# Patient Record
Sex: FEMALE | Race: BLACK OR AFRICAN AMERICAN | NOT HISPANIC OR LATINO | Employment: OTHER | URBAN - METROPOLITAN AREA
[De-identification: names, ages, dates, MRNs, and addresses within clinical notes are randomized per-mention and may not be internally consistent; named-entity substitution may affect disease eponyms.]

---

## 2017-03-01 PROBLEM — M20.41 HAMMER TOES OF BOTH FEET: Status: ACTIVE | Noted: 2017-03-01

## 2017-03-01 PROBLEM — M20.42 HAMMER TOES OF BOTH FEET: Status: ACTIVE | Noted: 2017-03-01

## 2017-03-01 PROBLEM — E11.40 DIABETIC NEUROPATHY: Status: ACTIVE | Noted: 2017-03-01

## 2017-04-25 PROBLEM — I50.30 HEART FAILURE WITH PRESERVED LEFT VENTRICULAR FUNCTION (HFPEF): Status: ACTIVE | Noted: 2017-04-25

## 2017-09-28 PROBLEM — N18.30 TYPE 2 DIABETES MELLITUS WITH STAGE 3 CHRONIC KIDNEY DISEASE, WITHOUT LONG-TERM CURRENT USE OF INSULIN: Status: ACTIVE | Noted: 2017-09-28

## 2017-09-28 PROBLEM — E11.22 TYPE 2 DIABETES MELLITUS WITH STAGE 3 CHRONIC KIDNEY DISEASE, WITHOUT LONG-TERM CURRENT USE OF INSULIN: Status: ACTIVE | Noted: 2017-09-28

## 2018-04-11 PROBLEM — S89.90XA KNEE INJURY: Status: ACTIVE | Noted: 2018-04-11

## 2018-05-10 ENCOUNTER — NURSE TRIAGE (OUTPATIENT)
Dept: ADMINISTRATIVE | Facility: CLINIC | Age: 68
End: 2018-05-10

## 2018-05-11 NOTE — TELEPHONE ENCOUNTER
Reason for Disposition   Diabetes drug error or overdose (e.g., insulin or extra dose)    Answer Assessment - Initial Assessment Questions  Pt's insulin changed today - stated she was to take 5 units of her novolog before meals and she took 25 units at 1900. cbg prior to this was 195. Currently eating dinner, bread, baked beans, ribs, mac and cheese. Has someone with her.    Protocols used:  POISONING-A-

## 2018-08-28 PROBLEM — I25.10 NON-OCCLUSIVE CORONARY ARTERY DISEASE: Status: ACTIVE | Noted: 2018-08-28

## 2018-08-28 PROBLEM — I25.10 NON-OCCLUSIVE CORONARY ARTERY DISEASE: Status: RESOLVED | Noted: 2018-08-28 | Resolved: 2018-08-28

## 2018-09-05 ENCOUNTER — TELEPHONE (OUTPATIENT)
Dept: ADMINISTRATIVE | Facility: HOSPITAL | Age: 68
End: 2018-09-05

## 2019-06-18 ENCOUNTER — PATIENT OUTREACH (OUTPATIENT)
Dept: ADMINISTRATIVE | Facility: HOSPITAL | Age: 69
End: 2019-06-18

## 2019-07-25 ENCOUNTER — TELEPHONE (OUTPATIENT)
Dept: ADMINISTRATIVE | Facility: HOSPITAL | Age: 69
End: 2019-07-25

## 2019-08-06 PROBLEM — R07.9 CHEST PAIN: Status: ACTIVE | Noted: 2019-08-06

## 2019-08-06 PROBLEM — I20.0 UNSTABLE ANGINA: Status: ACTIVE | Noted: 2019-08-06

## 2019-08-14 ENCOUNTER — PATIENT OUTREACH (OUTPATIENT)
Dept: ADMINISTRATIVE | Facility: HOSPITAL | Age: 69
End: 2019-08-14

## 2019-08-28 PROBLEM — I25.9 MYOCARDIAL ISCHEMIA: Status: ACTIVE | Noted: 2019-08-28

## 2019-08-28 PROBLEM — I25.110 CORONARY ARTERY DISEASE INVOLVING NATIVE CORONARY ARTERY OF NATIVE HEART WITH UNSTABLE ANGINA PECTORIS: Status: RESOLVED | Noted: 2018-08-28 | Resolved: 2019-08-28

## 2019-08-28 PROBLEM — I20.0 UNSTABLE ANGINA: Status: RESOLVED | Noted: 2019-08-06 | Resolved: 2019-08-28

## 2019-08-28 PROBLEM — I20.89 STABLE ANGINA: Status: ACTIVE | Noted: 2019-08-28

## 2019-08-28 PROBLEM — I25.110 CORONARY ARTERY DISEASE INVOLVING NATIVE CORONARY ARTERY OF NATIVE HEART WITH UNSTABLE ANGINA PECTORIS: Status: ACTIVE | Noted: 2018-08-28

## 2019-09-24 ENCOUNTER — TELEPHONE (OUTPATIENT)
Dept: ADMINISTRATIVE | Facility: HOSPITAL | Age: 69
End: 2019-09-24

## 2019-12-10 PROBLEM — Z86.010 HX OF COLONIC POLYPS: Status: ACTIVE | Noted: 2019-12-10

## 2019-12-10 PROBLEM — Z86.0100 HX OF COLONIC POLYPS: Status: ACTIVE | Noted: 2019-12-10

## 2020-01-29 PROBLEM — Z79.4 TYPE 2 DIABETES MELLITUS WITH STAGE 3 CHRONIC KIDNEY DISEASE, WITH LONG-TERM CURRENT USE OF INSULIN: Status: ACTIVE | Noted: 2017-09-28

## 2020-05-14 PROBLEM — I73.9 CLAUDICATION IN PERIPHERAL VASCULAR DISEASE: Status: ACTIVE | Noted: 2020-05-14

## 2020-05-20 PROBLEM — E11.22 TYPE 2 DIABETES MELLITUS WITH STAGE 3 CHRONIC KIDNEY DISEASE, WITHOUT LONG-TERM CURRENT USE OF INSULIN: Status: ACTIVE | Noted: 2020-05-20

## 2020-05-20 PROBLEM — I25.118 CORONARY ARTERY DISEASE OF NATIVE ARTERY OF NATIVE HEART WITH STABLE ANGINA PECTORIS: Status: ACTIVE | Noted: 2020-05-20

## 2020-05-20 PROBLEM — N18.30 TYPE 2 DIABETES MELLITUS WITH STAGE 3 CHRONIC KIDNEY DISEASE, WITH LONG-TERM CURRENT USE OF INSULIN: Status: RESOLVED | Noted: 2017-09-28 | Resolved: 2020-05-20

## 2020-05-20 PROBLEM — I73.9 CLAUDICATION: Status: ACTIVE | Noted: 2020-05-20

## 2020-05-20 PROBLEM — I51.7 LEFT ATRIAL ENLARGEMENT: Status: ACTIVE | Noted: 2020-05-20

## 2020-05-20 PROBLEM — I65.23 BILATERAL CAROTID ARTERY STENOSIS: Status: ACTIVE | Noted: 2020-05-20

## 2020-05-20 PROBLEM — Z79.4 TYPE 2 DIABETES MELLITUS WITH STAGE 3 CHRONIC KIDNEY DISEASE, WITH LONG-TERM CURRENT USE OF INSULIN: Status: RESOLVED | Noted: 2017-09-28 | Resolved: 2020-05-20

## 2020-05-20 PROBLEM — I50.30 HEART FAILURE WITH PRESERVED LEFT VENTRICULAR FUNCTION (HFPEF): Status: RESOLVED | Noted: 2017-04-25 | Resolved: 2020-05-20

## 2020-05-20 PROBLEM — I50.30 HEART FAILURE WITH PRESERVED EJECTION FRACTION, NYHA CLASS II: Status: ACTIVE | Noted: 2020-05-20

## 2020-05-20 PROBLEM — N18.30 TYPE 2 DIABETES MELLITUS WITH STAGE 3 CHRONIC KIDNEY DISEASE, WITHOUT LONG-TERM CURRENT USE OF INSULIN: Status: ACTIVE | Noted: 2020-05-20

## 2020-05-20 PROBLEM — I25.9 MYOCARDIAL ISCHEMIA: Status: RESOLVED | Noted: 2019-08-28 | Resolved: 2020-05-20

## 2020-05-20 PROBLEM — R07.9 CHEST PAIN: Status: RESOLVED | Noted: 2019-08-06 | Resolved: 2020-05-20

## 2020-05-20 PROBLEM — I73.9 CLAUDICATION IN PERIPHERAL VASCULAR DISEASE: Status: RESOLVED | Noted: 2020-05-14 | Resolved: 2020-05-20

## 2020-05-20 PROBLEM — T78.3XXA ACE INHIBITOR-AGGRAVATED ANGIOEDEMA: Status: ACTIVE | Noted: 2020-05-20

## 2020-05-20 PROBLEM — T46.4X5A ACE INHIBITOR-AGGRAVATED ANGIOEDEMA: Status: ACTIVE | Noted: 2020-05-20

## 2020-05-20 PROBLEM — E11.22 TYPE 2 DIABETES MELLITUS WITH STAGE 3 CHRONIC KIDNEY DISEASE, WITH LONG-TERM CURRENT USE OF INSULIN: Status: RESOLVED | Noted: 2017-09-28 | Resolved: 2020-05-20

## 2020-06-25 PROBLEM — I73.9 PERIPHERAL ARTERIAL DISEASE: Status: ACTIVE | Noted: 2020-06-25

## 2020-09-21 PROBLEM — I36.1 NONRHEUMATIC TRICUSPID VALVE REGURGITATION: Status: ACTIVE | Noted: 2020-09-21

## 2020-09-21 PROBLEM — R06.09 DYSPNEA ON EXERTION: Status: ACTIVE | Noted: 2020-09-21

## 2020-09-21 PROBLEM — I05.8 MITRAL VALVE SCLEROSIS: Status: ACTIVE | Noted: 2020-09-21

## 2020-09-21 PROBLEM — I65.21 STENOSIS OF RIGHT CAROTID ARTERY: Status: ACTIVE | Noted: 2020-09-21

## 2020-09-21 PROBLEM — I65.22 STENOSIS OF LEFT CAROTID ARTERY: Status: ACTIVE | Noted: 2020-05-20

## 2020-09-21 PROBLEM — R94.31 NONSPECIFIC ABNORMAL ELECTROCARDIOGRAM (ECG) (EKG): Status: ACTIVE | Noted: 2020-09-21

## 2020-09-21 PROBLEM — R00.1 SINUS BRADYCARDIA: Status: ACTIVE | Noted: 2020-09-21

## 2020-09-30 ENCOUNTER — LAB VISIT (OUTPATIENT)
Dept: PRIMARY CARE CLINIC | Facility: OTHER | Age: 70
End: 2020-09-30
Attending: INTERNAL MEDICINE
Payer: MEDICARE

## 2020-09-30 DIAGNOSIS — Z03.818 ENCOUNTER FOR OBSERVATION FOR SUSPECTED EXPOSURE TO OTHER BIOLOGICAL AGENTS RULED OUT: ICD-10-CM

## 2020-09-30 PROCEDURE — U0003 INFECTIOUS AGENT DETECTION BY NUCLEIC ACID (DNA OR RNA); SEVERE ACUTE RESPIRATORY SYNDROME CORONAVIRUS 2 (SARS-COV-2) (CORONAVIRUS DISEASE [COVID-19]), AMPLIFIED PROBE TECHNIQUE, MAKING USE OF HIGH THROUGHPUT TECHNOLOGIES AS DESCRIBED BY CMS-2020-01-R: HCPCS

## 2020-10-01 LAB — SARS-COV-2 RNA RESP QL NAA+PROBE: NOT DETECTED

## 2021-01-19 ENCOUNTER — LAB VISIT (OUTPATIENT)
Dept: PRIMARY CARE CLINIC | Facility: OTHER | Age: 71
End: 2021-01-19
Attending: INTERNAL MEDICINE
Payer: MEDICARE

## 2021-01-19 DIAGNOSIS — Z20.822 ENCOUNTER FOR LABORATORY TESTING FOR COVID-19 VIRUS: ICD-10-CM

## 2021-01-19 PROCEDURE — U0003 INFECTIOUS AGENT DETECTION BY NUCLEIC ACID (DNA OR RNA); SEVERE ACUTE RESPIRATORY SYNDROME CORONAVIRUS 2 (SARS-COV-2) (CORONAVIRUS DISEASE [COVID-19]), AMPLIFIED PROBE TECHNIQUE, MAKING USE OF HIGH THROUGHPUT TECHNOLOGIES AS DESCRIBED BY CMS-2020-01-R: HCPCS

## 2021-01-25 ENCOUNTER — TELEPHONE (OUTPATIENT)
Dept: PRIMARY CARE CLINIC | Facility: CLINIC | Age: 71
End: 2021-01-25

## 2021-05-06 ENCOUNTER — PATIENT MESSAGE (OUTPATIENT)
Dept: RESEARCH | Facility: HOSPITAL | Age: 71
End: 2021-05-06

## 2022-08-17 PROBLEM — E11.42 ONYCHOMYCOSIS OF MULTIPLE TOENAILS WITH TYPE 2 DIABETES MELLITUS AND PERIPHERAL NEUROPATHY: Status: ACTIVE | Noted: 2022-08-17

## 2022-08-17 PROBLEM — Z79.4 TYPE 2 DIABETES MELLITUS WITH STAGE 3B CHRONIC KIDNEY DISEASE, WITH LONG-TERM CURRENT USE OF INSULIN: Status: ACTIVE | Noted: 2020-05-20

## 2022-08-17 PROBLEM — B35.1 ONYCHOMYCOSIS OF MULTIPLE TOENAILS WITH TYPE 2 DIABETES MELLITUS AND PERIPHERAL NEUROPATHY: Status: ACTIVE | Noted: 2022-08-17

## 2022-08-17 PROBLEM — N18.32 TYPE 2 DIABETES MELLITUS WITH STAGE 3B CHRONIC KIDNEY DISEASE, WITH LONG-TERM CURRENT USE OF INSULIN: Status: ACTIVE | Noted: 2020-05-20

## 2022-08-17 PROBLEM — E11.69 HYPERLIPIDEMIA ASSOCIATED WITH TYPE 2 DIABETES MELLITUS: Status: ACTIVE | Noted: 2022-08-17

## 2022-08-17 PROBLEM — E11.69 ONYCHOMYCOSIS OF MULTIPLE TOENAILS WITH TYPE 2 DIABETES MELLITUS AND PERIPHERAL NEUROPATHY: Status: ACTIVE | Noted: 2022-08-17

## 2022-08-17 PROBLEM — E78.5 HYPERLIPIDEMIA ASSOCIATED WITH TYPE 2 DIABETES MELLITUS: Status: ACTIVE | Noted: 2022-08-17

## 2023-04-29 PROBLEM — E11.9 DIABETES MELLITUS, TYPE 2: Status: ACTIVE | Noted: 2020-05-20

## 2023-04-29 PROBLEM — R07.9 CHEST PAIN: Status: RESOLVED | Noted: 2019-08-06 | Resolved: 2023-04-29

## 2023-12-01 PROBLEM — E55.9 VITAMIN D DEFICIENCY: Status: ACTIVE | Noted: 2023-12-01

## 2024-01-22 DIAGNOSIS — M79.672 LEFT FOOT PAIN: Primary | ICD-10-CM

## 2024-01-24 ENCOUNTER — LAB VISIT (OUTPATIENT)
Dept: LAB | Facility: HOSPITAL | Age: 74
End: 2024-01-24
Attending: PSYCHIATRY & NEUROLOGY
Payer: MEDICARE

## 2024-01-24 ENCOUNTER — OFFICE VISIT (OUTPATIENT)
Dept: NEUROLOGY | Facility: CLINIC | Age: 74
End: 2024-01-24
Payer: MEDICARE

## 2024-01-24 VITALS
DIASTOLIC BLOOD PRESSURE: 75 MMHG | BODY MASS INDEX: 24.39 KG/M2 | HEIGHT: 71 IN | HEART RATE: 94 BPM | SYSTOLIC BLOOD PRESSURE: 135 MMHG | WEIGHT: 174.19 LBS

## 2024-01-24 DIAGNOSIS — E11.42 DIABETIC POLYNEUROPATHY ASSOCIATED WITH TYPE 2 DIABETES MELLITUS: Primary | ICD-10-CM

## 2024-01-24 DIAGNOSIS — R20.0 NUMBNESS AND TINGLING: ICD-10-CM

## 2024-01-24 DIAGNOSIS — M79.672 LEFT FOOT PAIN: ICD-10-CM

## 2024-01-24 DIAGNOSIS — R20.2 NUMBNESS AND TINGLING: ICD-10-CM

## 2024-01-24 DIAGNOSIS — M86.179 OTHER ACUTE OSTEOMYELITIS, UNSPECIFIED ANKLE AND FOOT: ICD-10-CM

## 2024-01-24 DIAGNOSIS — I73.9 PAD (PERIPHERAL ARTERY DISEASE): ICD-10-CM

## 2024-01-24 LAB
ALBUMIN SERPL BCP-MCNC: 3.8 G/DL (ref 3.5–5.2)
ANION GAP SERPL CALC-SCNC: 9 MMOL/L (ref 8–16)
BASOPHILS # BLD AUTO: 0.05 K/UL (ref 0–0.2)
BASOPHILS NFR BLD: 0.7 % (ref 0–1.9)
BUN SERPL-MCNC: 24 MG/DL (ref 8–23)
CALCIUM SERPL-MCNC: 10.1 MG/DL (ref 8.7–10.5)
CHLORIDE SERPL-SCNC: 106 MMOL/L (ref 95–110)
CO2 SERPL-SCNC: 27 MMOL/L (ref 23–29)
CREAT SERPL-MCNC: 1.5 MG/DL (ref 0.5–1.4)
DIFFERENTIAL METHOD BLD: ABNORMAL
EOSINOPHIL # BLD AUTO: 0.4 K/UL (ref 0–0.5)
EOSINOPHIL NFR BLD: 5.5 % (ref 0–8)
ERYTHROCYTE [DISTWIDTH] IN BLOOD BY AUTOMATED COUNT: 14.1 % (ref 11.5–14.5)
ERYTHROCYTE [SEDIMENTATION RATE] IN BLOOD BY PHOTOMETRIC METHOD: 35 MM/HR (ref 0–36)
EST. GFR  (NO RACE VARIABLE): 37 ML/MIN/1.73 M^2
GLUCOSE SERPL-MCNC: 104 MG/DL (ref 70–110)
HCT VFR BLD AUTO: 30 % (ref 37–48.5)
HGB BLD-MCNC: 9.8 G/DL (ref 12–16)
IMM GRANULOCYTES # BLD AUTO: 0.02 K/UL (ref 0–0.04)
IMM GRANULOCYTES NFR BLD AUTO: 0.3 % (ref 0–0.5)
LYMPHOCYTES # BLD AUTO: 1.8 K/UL (ref 1–4.8)
LYMPHOCYTES NFR BLD: 24.1 % (ref 18–48)
MCH RBC QN AUTO: 28.7 PG (ref 27–31)
MCHC RBC AUTO-ENTMCNC: 32.7 G/DL (ref 32–36)
MCV RBC AUTO: 88 FL (ref 82–98)
MONOCYTES # BLD AUTO: 0.7 K/UL (ref 0.3–1)
MONOCYTES NFR BLD: 8.6 % (ref 4–15)
NEUTROPHILS # BLD AUTO: 4.7 K/UL (ref 1.8–7.7)
NEUTROPHILS NFR BLD: 60.8 % (ref 38–73)
NRBC BLD-RTO: 0 /100 WBC
PHOSPHATE SERPL-MCNC: 2.9 MG/DL (ref 2.7–4.5)
PLATELET # BLD AUTO: 349 K/UL (ref 150–450)
PLATELET # BLD AUTO: 349 K/UL (ref 150–450)
PMV BLD AUTO: 10.3 FL (ref 9.2–12.9)
PMV BLD AUTO: 10.3 FL (ref 9.2–12.9)
POTASSIUM SERPL-SCNC: 4.4 MMOL/L (ref 3.5–5.1)
RBC # BLD AUTO: 3.41 M/UL (ref 4–5.4)
SODIUM SERPL-SCNC: 142 MMOL/L (ref 136–145)
WBC # BLD AUTO: 7.64 K/UL (ref 3.9–12.7)

## 2024-01-24 PROCEDURE — 85025 COMPLETE CBC W/AUTO DIFF WBC: CPT | Performed by: PSYCHIATRY & NEUROLOGY

## 2024-01-24 PROCEDURE — 36415 COLL VENOUS BLD VENIPUNCTURE: CPT | Performed by: PSYCHIATRY & NEUROLOGY

## 2024-01-24 PROCEDURE — 82607 VITAMIN B-12: CPT | Performed by: PSYCHIATRY & NEUROLOGY

## 2024-01-24 PROCEDURE — 85652 RBC SED RATE AUTOMATED: CPT | Performed by: PSYCHIATRY & NEUROLOGY

## 2024-01-24 PROCEDURE — 80069 RENAL FUNCTION PANEL: CPT | Performed by: PSYCHIATRY & NEUROLOGY

## 2024-01-24 PROCEDURE — 99999 PR PBB SHADOW E&M-EST. PATIENT-LVL V: CPT | Mod: PBBFAC,,, | Performed by: PSYCHIATRY & NEUROLOGY

## 2024-01-24 PROCEDURE — 99205 OFFICE O/P NEW HI 60 MIN: CPT | Mod: S$GLB,,, | Performed by: PSYCHIATRY & NEUROLOGY

## 2024-01-24 RX ORDER — PREGABALIN 75 MG/1
CAPSULE ORAL
Qty: 90 CAPSULE | Refills: 5 | Status: SHIPPED | OUTPATIENT
Start: 2024-01-24 | End: 2024-03-12 | Stop reason: SDUPTHER

## 2024-01-24 NOTE — PROGRESS NOTES
Subjective:       Patient ID: Porsche Ring is a 73 y.o. female.    Chief Complaint: Foot Pain (Bilateral Foot pain do to nerve related issues but mostly left sided)      Ms Ring is a 73 yr old AAF w.PAD, s/p LLE revascularization procedure 3 weeks ago, T2DM, DPN, HF, HTN, HLD CKD3, and glaucoma and is here with c/o severe LLE pain.  She is a poor historian in the chronology of the history of the onset of her left leg pain is vague at best.  She is distressed at least in part due to pain that she is experiencing today.    When pressed for details she states that all of the toes of the left foot the toenails, and as well as the lateral left foot hurt severely.  She describes burning and occasional electric shocks.  There is tingling in the sole of the left foot and in the toes of the right foot.  She feels that there is fluid running through her foot.  She does have a component of cramps and restlessness of both legs and she feels like all of her toes are swollen.  She states this has been most severe for the past month despite the absence of an injury.   She denies fever but she states she is worried about an infection.      She states she has been to a podiatrist urgent care in the emergency room.      She gets some relief by propping the leg and it is much worse with weight-bearing.  She states that she has to slide her leg using a rolling walker.  There have been no falls.      Both arms are fine in the right foot is at baseline with regard to DPN.      She does not smoke or drink.      As noted recently had left lower extremity angiogram for PAD for which she had laser therapy.        Surgery Date: 1/6/2023  Pre-op diagnosis: PAD (peripheral artery disease) [I73.9]  Post-op diagnosis: Post-Op Diagnosis Codes:     * PAD (peripheral artery disease) [I73.9]  Procedure: Left leg peripheral angiogram with runoff.   Indication: PAD with claudication  Physician:  Gaudencio Evans MD     Entry: Right femoral artery.  Closure with Vascade.      Angiographic Findings:      Right Lower Extremity   -Not visualized      Left Lower Extremity  -common iliac artery: not visualized  -external iliac artery:not visualized  -internal iliac artery:not visualized  -common femoral artery: not visualized  -SFA:  Proximal and distal 90% InStent restenosis  -popliteal artery:  100% InStent restenosis  -anterior tibial artery:  100% occluded  -tibioperoneal trunk:  100% InStent restenosis  -posterior tibial artery:  100% occluded  -peroneal artery:  100% InStent restenosis     Intervention:  1. Successful laser atherectomy/ PTA/ DCB of the left SFA with reduction of stenosis from 90% to 20%  2. Successful laser atherectomy/ PTA/ DCB of the left popliteal artery with reduction of stenosis from 100% to 10%.  3. Successful laser atherectomy/ PTA/ DCB of the left tibioperoneal trunk artery with reduction of stenosis from 100% to 10%.  4. Successful laser atherectomy/ PTA of the left peroneal artery with reduction of stenosis from 100% to 10%.        Complications: none  Total Contrast:5 mL  CO2: 40 ml  Estimated Blood Loss: 20 mL  Placement: Observation        Plan:  1. Results of procedure and plan of care discussed with family.   2. The patient was hemodynamically stable at the end of the procedure and transferred to the cath lab recovery area.  3. Continue aggressive medical management for PVD with Plavix, Eliquis, Pletal and high-dose statin.  Patient was reloaded with 300 mg of Plavix in cath lab and will resume 75 mg daily a Plavix starting tomorrow  4. No ASA to avoid triple therapy  5. Resume Eliquis tonight   6. Discharge home today once bedrest complete and criteria met  7. Follow up with [facility] as OP     Danay Landry, RN, BSN scribe for MD Gaudencio Torres MD       Case Tracking Events                    US Lower Extremity Veins Left  Order: [id number]  Status: Final result       Visible to patient: Yes (not seen)        Next appt: 01/29/2024 at 10:15 AM in Podiatry (Jayda Deng DPM)       Dx: Leg pain    0 Result Notes  Details      Reading Physician Reading Date Result Priority  Chava Gimenez MD  [phone] 7/20/2023 STAT    Narrative & Impression  EXAMINATION:  US LOWER EXTREMITY VEINS LEFT     CLINICAL HISTORY:  Leg pain     COMPARISON:  None     FINDINGS:  Duplex ultrasound evaluation of the deep venous system of the left lower extremity demonstrates patency and compressibility of the imaged vessels.  No intraluminal echogenic focus identified to suggest thrombus.     Impression:     No sonographic evidence of DVT of the left lower extremity.        Electronically signed by: Chava Gimenez MD  Date:                                            07/20/2023  Time:                                           22:34        Exam Ended: 07/20/23 20:03 CDT              Past Medical History:   Diagnosis Date    Anemia     Anxiety and depression     Arthritis     Bronchitis     Bulging of lumbar intervertebral disc     Carotid bruit     Cataract     CHF (congestive heart failure)     Chronic kidney disease     Chronic pain     scoliosis    COPD (chronic obstructive pulmonary disease)     Coronary artery disease     Deep vein thrombosis     Depression     Diabetes mellitus, type 2     Diastolic heart failure     Digestive disorder     GERD    Encounter for blood transfusion     Glaucoma     Heart murmur     History of 2019 novel coronavirus disease (COVID-19) 12/06/2022    Hyperlipidemia     Hypertension     Neuropathy     Neuropathy     Pneumonia 11/10/2019    Pneumonia 2019    PVD (peripheral vascular disease)     Restless leg syndrome     Scoliosis deformity of spine     UTI (urinary tract infection)     Valvular regurgitation     mild mr    Venous insufficiency       Past Surgical History:   Procedure Laterality Date     stent left leg Left     X 5    ANGIOGRAM LEFT LEG      JUNE OR JULY 2023 AT ANOTHER FACILITY    ANGIOGRAM, EXTREMITY,  BILATERAL N/A 2023    Procedure: ANGIOGRAM, EXTREMITY, BILATERAL;  Surgeon: Gaudencio Evans MD;  Location: [facility] CATH;  Service: Cardiology;  Laterality: N/A;    CATARACT EXTRACTION Right 2017    CE IOL stent placed    CATARACT EXTRACTION Left 2017    CE IOL stent placed     SECTION      COLONOSCOPY N/A 12/10/2019    Procedure: COLONOSCOPY;  Surgeon: Luis Bogran-Reyes, MD;  Location: [facility];  Service: Endoscopy;  Laterality: N/A;    EYE SURGERY      HYSTERECTOMY      INSERTION OF STENT INTO PERIPHERAL VESSEL Right 2024    Procedure: INSERTION, STENT, VESSEL, PERIPHERAL;  Surgeon: Gaudencio Evans MD;  Location: [facility] CATH;  Service: Cardiology;  Laterality: Right;    KNEE SURGERY Left     LEFT HEART CATHETERIZATION Left 2019    Procedure: CATHETERIZATION, HEART, LEFT;  Surgeon: Cecil Orozco MD;  Location: [facility] CATH LAB;  Service: Cardiology;  Laterality: Left;    OOPHORECTOMY      PERCUTANEOUS TRANSLUMINAL ANGIOPLASTY (PTA) OF PERIPHERAL VESSEL Left 2020    Procedure: PTA, PERIPHERAL VESSEL;  Surgeon: Gaudencio Evans MD;  Location: [facility] CATH;  Service: Cardiology;  Laterality: Left;  Left popliteal intervention  w/ CO2 via R CFA    PERCUTANEOUS TRANSLUMINAL ANGIOPLASTY (PTA) OF PERIPHERAL VESSEL N/A 2020    Procedure: PTA, PERIPHERAL VESSEL;  Surgeon: Gaudencio Evans MD;  Location: [facility] CATH;  Service: Cardiology;  Laterality: N/A;   right leg intervention via the left common femoral artery    PERCUTANEOUS TRANSLUMINAL ANGIOPLASTY (PTA) OF PERIPHERAL VESSEL N/A 10/29/2020    Procedure: PTA, PERIPHERAL VESSEL and possible PVI by R CFA U&O approach;  Surgeon: Gaudencio Evans MD;  Location: [facility] CATH;  Service: Cardiology;  Laterality: N/A;    TUBAL LIGATION      ULTRASOUND GUIDANCE  2019    Procedure: Ultrasound Guidance;  Surgeon: Cecil Orozco MD;  Location: [facility] CATH LAB;  Service: Cardiology;;        Current Outpatient Medications:      amLODIPine (NORVASC) 5 MG tablet, Take 1 tablet (5 mg total) by mouth once daily., Disp: 30 tablet, Rfl: 11    apixaban (ELIQUIS) 5 mg Tab, Take 1 tablet (5 mg total) by mouth 2 (two) times daily., Disp: , Rfl:     blood sugar diagnostic Strp, 1 each by Misc.(Non-Drug; Combo Route) route 3 (three) times daily., Disp: 300 strip, Rfl: 3    carvediloL (COREG) 12.5 MG tablet, Take 1 tablet (12.5 mg total) by mouth 2 (two) times daily with meals., Disp: 180 tablet, Rfl: 3    cilostazoL (PLETAL) 50 MG Tab, Take 1 tablet (50 mg total) by mouth 2 (two) times daily., Disp: 180 tablet, Rfl: 3    clopidogreL (PLAVIX) 75 mg tablet, Take 1 tablet (75 mg total) by mouth once daily., Disp: 90 tablet, Rfl: 3    dexlansoprazole (DEXILANT) 60 mg capsule, Take 60 mg by mouth once daily., Disp: , Rfl:     DULoxetine (CYMBALTA) 60 MG capsule, TAKE 1 CAPSULE(60 MG) BY MOUTH EVERY EVENING, Disp: 30 capsule, Rfl: 3    ergocalciferol (ERGOCALCIFEROL) 50,000 unit Cap, Take 1 capsule (50,000 Units total) by mouth every 7 days., Disp: 12 capsule, Rfl: 1    furosemide (LASIX) 40 MG tablet, Take 40 mg by mouth once daily., Disp: , Rfl:     HYDROcodone-acetaminophen (NORCO)  mg per tablet, Take 1 tablet by mouth every 8 (eight) hours as needed for Pain. , Disp: , Rfl:     lancets 32 gauge Misc, 1 lancet by Misc.(Non-Drug; Combo Route) route 3 (three) times daily before meals., Disp: 300 each, Rfl: 3    latanoprost 0.005 % ophthalmic solution, Place 1 drop into both eyes once daily., Disp: , Rfl:     linaGLIPtin (TRADJENTA) 5 mg Tab tablet, Take 1 tablet (5 mg total) by mouth once daily., Disp: 90 tablet, Rfl: 1    losartan (COZAAR) 25 MG tablet, Take 25 mg by mouth every evening., Disp: , Rfl:     mv-min-FA-D3-om3-dha-epa-fish 200 mcg-500 unit-200 mg Cap, Take 2 capsules by mouth once daily., Disp: , Rfl:     ranolazine (RANEXA) 500 MG Tb12, Take 500 mg by mouth 2 (two) times daily. NOON AND NIGHTLY, Disp: , Rfl:     rosuvastatin (CRESTOR)  40 MG Tab, TAKE 1 TABLET(40 MG) BY MOUTH EVERY EVENING FOR CHOLESTEROL, Disp: 90 tablet, Rfl: 3    TRESIBA FLEXTOUCH U-100 100 unit/mL (3 mL) insulin pen, ADMINISTER 52 UNITS UNDER THE SKIN EVERY DAY, Disp: , Rfl:     blood-glucose meter (TRUE METRIX AIR GLUCOSE METER) kit, True metrix meter kit; Use as instructed, Disp: 1 each, Rfl: 0    LIDOcaine 5 % Gel, Apply to both feet twice a day as needed, Disp: 30 g, Rfl: 5    pregabalin (LYRICA) 75 MG capsule, One po mid day and 2 po qhs, Disp: 90 capsule, Rfl: 5   Review of patient's allergies indicates:   Allergen Reactions    Jardiance [empagliflozin] Other (See Comments)     UTI     Lisinopril Swelling    Metformin Other (See Comments)     CKD/HF        Review of Systems   Constitutional:  Negative for fever.   Genitourinary:  Positive for bladder incontinence (for yrs. + hx of bladder suspension).   Musculoskeletal:  Positive for back pain (+ hx of LBP, was relieved with blocks at a pain clinic ( yrs ago)) and gait problem.   Neurological:  Positive for weakness.   Psychiatric/Behavioral:  Positive for sleep disturbance. Self-injury: hard to sleep bc of the pain.           Objective:      Physical Exam  Musculoskeletal:      Right lower leg: No edema.      Left lower leg: Edema present.      Comments: Left ankle is very warm   Neurological:      Cranial Nerves: Cranial nerves 2-12 are intact. No dysarthria.      Motor: Weakness and abnormal muscle tone (mild incr tone BLE) present. No tremor.      Gait: Gait abnormal (antalgic, hobbling).      Deep Tendon Reflexes:      Reflex Scores:       Tricep reflexes are 1+ on the right side and 1+ on the left side.       Bicep reflexes are 1+ on the right side and 1+ on the left side.       Patellar reflexes are 0 on the right side and 0 on the left side.       Achilles reflexes are 0 on the right side and 0 on the left side.     Comments: EOMI    + allodynia of Lt foot globally  Normal touch and PP both feet and legs.   BUE  5/5 prox and distal    Left ankle and foot exquisitely sensitive to palpation. Left ankle moderately swollen and very warm.No skin breakdown. No redness.   Toenails thick and 2nd  toe overlaps the first in both feet.    Cannot tolerate wt bearing on Lt foot  Cannot tolerate palpation of Lt foot  Rt foot is fine, no pain to touch or pressure and not swollen.     Large flat eccyhmosis across anterior and superior Lt thigh             Assessment:       1. Diabetic polyneuropathy associated with type 2 diabetes mellitus    2. Left foot pain    3. Numbness and tingling        Plan:          Subacute severe pain in the LLE in unhealthy elderly pt with DPN and PAD.  Etiology not simply DPN.    Note 3 weeks s/p successful laser atherectomy of multiple aa of the LLE   By hx the pain predated this procedure.  Exam is notable for warmth and swelling of the Lt ankle and foot ( not present in Rt foot) , allodynia and severe tenderness to palpation. She has no fever but an infection is in ddx and thus plan imaging and lab today  Note CRPS in ddx.    Will change gabapentin to lyrica and add topical lidocaine.               Skylar Willis MD   01/24/2024   9:47 AM

## 2024-01-25 ENCOUNTER — TELEPHONE (OUTPATIENT)
Dept: NEUROLOGY | Facility: CLINIC | Age: 74
End: 2024-01-25
Payer: MEDICARE

## 2024-01-25 DIAGNOSIS — M79.672 LEFT FOOT PAIN: Primary | ICD-10-CM

## 2024-01-25 LAB — VIT B12 SERPL-MCNC: 438 PG/ML (ref 210–950)

## 2024-01-25 NOTE — TELEPHONE ENCOUNTER
----- Message from Skylar Willis MD sent at 1/25/2024  9:51 AM CST -----  Regarding: RE: mri  Ok thx. The venous US can be at [facility] as well. Let the pharmacy know the lidocaine cream is ok . cg  ----- Message -----  From: Jazz Ramirez MA  Sent: 1/25/2024   8:52 AM CST  To: Skylar Willis MD  Subject: FW: mri                                          Scheduled and the patient is scheduled for both MRI's done today at 1 pm at [facility] in [place]. I am working on getting the CV and also EMG scheduled also.  ----- Message -----  From: Skylar Willis MD  Sent: 1/24/2024   5:51 PM CST  To: Jazz Ramirez MA  Subject: mri                                              Jazz, I ordered a stat MRI of the Lt ankle and foot ( w/out contrast) on this pt. Can yo uhelp to get this scheduled asap? If ins denies without first doing an Xray, ask her to go get the xray right away, ie tomorrow. Ty, cg

## 2024-01-26 ENCOUNTER — TELEPHONE (OUTPATIENT)
Dept: NEUROLOGY | Facility: CLINIC | Age: 74
End: 2024-01-26
Payer: MEDICARE

## 2024-01-26 NOTE — TELEPHONE ENCOUNTER
----- Message from Skylar Willis MD sent at 1/26/2024  3:14 PM CST -----  Pls let her know the US is negative for a clot and the MRI shows swelling but nothing suggestive of an infection in the soft tissue or bone, but that I have to defer to her PCP and that I have been unable to reach him. She should reach out to the cardiologist who did her procedure first; he knows her well

## 2024-01-26 NOTE — TELEPHONE ENCOUNTER
----- Message from Sumaya August sent at 1/26/2024 10:32 AM CST -----  Type:  Pharmacy Calling to Clarify an RX    Name of Caller:Fer  Pharmacy Name:[facility]  Prescription Name:LIDOcaine 5 % Gel  What do they need to clarify?:The gel is not available to order.  Do you want to change it to the cream  Best Call Back Number:[phone]  fax [phone]  Additional Information:  Pharmacy stated they have been waiting five days for an answer.

## 2024-01-26 NOTE — TELEPHONE ENCOUNTER
Called and spoke to [place] pharmacy about patients medication switch from gel to cream. The staff voiced understanding.

## 2024-02-16 ENCOUNTER — OFFICE VISIT (OUTPATIENT)
Dept: PODIATRY | Facility: CLINIC | Age: 74
End: 2024-02-16
Payer: MEDICARE

## 2024-02-16 VITALS
DIASTOLIC BLOOD PRESSURE: 71 MMHG | HEART RATE: 79 BPM | SYSTOLIC BLOOD PRESSURE: 135 MMHG | BODY MASS INDEX: 25.06 KG/M2 | WEIGHT: 179 LBS | HEIGHT: 71 IN

## 2024-02-16 DIAGNOSIS — M21.619 BUNION: Chronic | ICD-10-CM

## 2024-02-16 DIAGNOSIS — M79.672 LEFT FOOT PAIN: Primary | ICD-10-CM

## 2024-02-16 DIAGNOSIS — M20.42 HAMMER TOE OF LEFT FOOT: Chronic | ICD-10-CM

## 2024-02-16 DIAGNOSIS — I73.9 PAD (PERIPHERAL ARTERY DISEASE): ICD-10-CM

## 2024-02-16 PROCEDURE — 99203 OFFICE O/P NEW LOW 30 MIN: CPT | Mod: S$GLB,,, | Performed by: PODIATRIST

## 2024-02-16 PROCEDURE — 99999 PR PBB SHADOW E&M-EST. PATIENT-LVL IV: CPT | Mod: PBBFAC,,, | Performed by: PODIATRIST

## 2024-02-16 RX ORDER — GABAPENTIN 300 MG/1
CAPSULE ORAL
COMMUNITY
Start: 2023-05-05 | End: 2024-03-12

## 2024-02-16 RX ORDER — BUDESONIDE AND FORMOTEROL FUMARATE DIHYDRATE 160; 4.5 UG/1; UG/1
AEROSOL RESPIRATORY (INHALATION)
COMMUNITY

## 2024-02-16 RX ORDER — CIPROFLOXACIN 500 MG/1
TABLET ORAL
COMMUNITY

## 2024-02-16 RX ORDER — HYDROCHLOROTHIAZIDE 25 MG/1
TABLET ORAL
COMMUNITY

## 2024-02-16 RX ORDER — FLUTICASONE PROPIONATE 50 MCG
SPRAY, SUSPENSION (ML) NASAL
COMMUNITY

## 2024-02-16 RX ORDER — TAMSULOSIN HYDROCHLORIDE 0.4 MG/1
1 CAPSULE ORAL
COMMUNITY
Start: 2023-05-02

## 2024-02-16 RX ORDER — ALBUTEROL SULFATE 90 UG/1
AEROSOL, METERED RESPIRATORY (INHALATION)
COMMUNITY

## 2024-02-28 ENCOUNTER — HOSPITAL ENCOUNTER (OUTPATIENT)
Dept: CARDIOLOGY | Facility: HOSPITAL | Age: 74
Discharge: HOME | End: 2024-02-28
Attending: PSYCHIATRY & NEUROLOGY
Payer: MEDICARE

## 2024-02-28 DIAGNOSIS — M79.672 LEFT FOOT PAIN: ICD-10-CM

## 2024-02-28 PROCEDURE — 93971 EXTREMITY STUDY: CPT | Mod: 26,LT,, | Performed by: INTERNAL MEDICINE

## 2024-02-28 PROCEDURE — 93971 EXTREMITY STUDY: CPT | Mod: LT

## 2024-02-28 NOTE — PROGRESS NOTES
PODIATRY    PATIENT ID:  Porsche Ring is a 73 y.o. female.     CHIEF CONCERN:   Foot Pain (Left foot pain since January.)         MEDICAL DECISION MAKING:      Problem List Items Addressed This Visit       PAD (peripheral artery disease)    Overview     S/p 3 stents LLE          Other Visit Diagnoses       Left foot pain    -  Primary    Hammer toe of left foot  (Chronic)       Bunion  (Chronic)               I counseled the patient on the patient's conditions, their implications and medical management.    Xrays ordered.   Shoe and activity modification as needed for relief.     Continue conservative treatment for foot pain, not good candidate for bunion and hammertoe surgery due to PVD.    OTC topical NSAIDS as needed pain          HISTORY OF PRESENT ILLNESS:  Porsche Ring is a 73 y.o. female with concerns regarding: Foot Pain (Left foot pain since January.)  Due to bunions and hammertoes.  She also has PVD s/p stent insertion.   Patient reports symptoms/signs have been present since January.   Trauma/injury to the area:  none recalled.         Patient Active Problem List   Diagnosis    Primary hypertension    HLD (hyperlipidemia)    PAD (peripheral artery disease)    CKD (chronic kidney disease) stage 3, GFR 30-59 ml/min    Glaucoma    S/P peripheral artery angioplasty with stent placement    Mild mitral regurgitation    Left ventricular hypertrophy    Diabetic neuropathy    Hammer toes of both feet    Knee injury    Stable angina    Hx of colonic polyps    Heart failure with preserved ejection fraction, NYHA class II    Coronary artery disease of native artery of native heart with stable angina pectoris    Diabetes mellitus, type 2    Left atrial enlargement    Stenosis of left carotid artery    Claudication    ACE inhibitor-aggravated angioedema    Peripheral arterial disease    Dyspnea on exertion    Nonrheumatic tricuspid valve regurgitation    Mitral valve sclerosis    Sinus bradycardia    Nonspecific  abnormal electrocardiogram (ECG) (EKG)    Stenosis of right carotid artery    Onychomycosis of multiple toenails with type 2 diabetes mellitus and peripheral neuropathy    Hyperlipidemia associated with type 2 diabetes mellitus    BMI 26.0-26.9,adult    Vitamin D deficiency       Current Outpatient Medications on File Prior to Visit   Medication Sig Dispense Refill    albuterol (VENTOLIN HFA) 90 mcg/actuation inhaler 1 puff as needed Inhalation every 4 hrs      amLODIPine (NORVASC) 5 MG tablet Take 1 tablet (5 mg total) by mouth once daily. 30 tablet 11    apixaban (ELIQUIS) 5 mg Tab Take 1 tablet (5 mg total) by mouth 2 (two) times daily.      blood sugar diagnostic Strp 1 each by Misc.(Non-Drug; Combo Route) route 3 (three) times daily. 300 strip 3    carvediloL (COREG) 12.5 MG tablet Take 1 tablet (12.5 mg total) by mouth 2 (two) times daily with meals. 180 tablet 3    cilostazoL (PLETAL) 50 MG Tab Take 1 tablet (50 mg total) by mouth 2 (two) times daily. 180 tablet 3    ciprofloxacin HCl (CIPRO) 500 MG tablet 1 tablet Orally every 12 hrs FOR 5 DAYS      clopidogreL (PLAVIX) 75 mg tablet Take 1 tablet (75 mg total) by mouth once daily. 90 tablet 3    dexlansoprazole (DEXILANT) 60 mg capsule Take 60 mg by mouth once daily.      DULoxetine (CYMBALTA) 60 MG capsule TAKE 1 CAPSULE(60 MG) BY MOUTH EVERY EVENING 30 capsule 3    empagliflozin (JARDIANCE) 10 mg tablet 1 tablet Orally Once a day for 30 day(s)      ergocalciferol (ERGOCALCIFEROL) 50,000 unit Cap Take 1 capsule (50,000 Units total) by mouth every 7 days. 12 capsule 1    fluticasone propionate (FLONASE ALLERGY RELIEF) 50 mcg/actuation nasal spray 1 spray in each nostril Nasally TWICE A DAY for 30 days      furosemide (LASIX) 40 MG tablet Take 40 mg by mouth once daily.      gabapentin (NEURONTIN) 300 MG capsule 1 tablet Orally Twice a day      hydroCHLOROthiazide (HYDRODIURIL) 25 MG tablet 1 tablet in the morning Orally Once a day for 30 days       "HYDROcodone-acetaminophen (NORCO)  mg per tablet Take 1 tablet by mouth every 8 (eight) hours as needed for Pain.       lancets 32 gauge Misc 1 lancet by Misc.(Non-Drug; Combo Route) route 3 (three) times daily before meals. 300 each 3    latanoprost 0.005 % ophthalmic solution Place 1 drop into both eyes once daily.      LIDOcaine 5 % Gel Apply to both feet twice a day as needed 30 g 5    linaGLIPtin (TRADJENTA) 5 mg Tab tablet Take 1 tablet (5 mg total) by mouth once daily. 90 tablet 1    losartan (COZAAR) 25 MG tablet Take 25 mg by mouth every evening.      mv-min-FA-D3-om3-dha-epa-fish 200 mcg-500 unit-200 mg Cap Take 2 capsules by mouth once daily.      pregabalin (LYRICA) 75 MG capsule One po mid day and 2 po qhs 90 capsule 5    ranolazine (RANEXA) 500 MG Tb12 Take 500 mg by mouth 2 (two) times daily. NOON AND NIGHTLY      rosuvastatin (CRESTOR) 40 MG Tab TAKE 1 TABLET(40 MG) BY MOUTH EVERY EVENING FOR CHOLESTEROL 90 tablet 3    SYMBICORT 160-4.5 mcg/actuation HFAA 2 puffs Inhalation Twice a day for 30 days      tamsulosin (FLOMAX) 0.4 mg Cap 1 capsule.      TRESIBA FLEXTOUCH U-100 100 unit/mL (3 mL) insulin pen ADMINISTER 52 UNITS UNDER THE SKIN EVERY DAY      blood-glucose meter (TRUE METRIX AIR GLUCOSE METER) kit True metrix meter kit; Use as instructed 1 each 0     No current facility-administered medications on file prior to visit.       Review of patient's allergies indicates:   Allergen Reactions    Jardiance [empagliflozin] Other (See Comments)     UTI     Lisinopril Swelling    Metformin Other (See Comments)     CKD/HF         ROS:   General ROS: negative for - chills, fever or night sweats  Respiratory ROS: no cough, shortness of breath, or wheezing  Cardiovascular ROS: no chest pain or dyspnea on exertion      EXAM:     Vitals:    02/16/24 1147   BP: 135/71   Pulse: 79   Weight: 81.2 kg (179 lb)   Height: 5' 10.98" (1.803 m)        General:  Alert and Oriented x 3;  No acute " distress    Vascular:   Dorsalis Pedis:  present   Posterior Tibial:  present  Capillary refill time:  3 seconds  Temperature of toes warm to touch  Edema:  1+       Neurological:     Sharp touch:  normal  Light touch: normal  Tinels Sign:  Absent  Mulders Click:   Absent      Dermatological:   Skin: thin, atrophic, and shiny  Wounds/Ulcers:  Absent  Bruising:  Absent  Erythema:  Absent      Musculoskeletal:   Metatarsophalangeal range of motion:   diminished range of motion  Subtalar joint range of motion: diminished range of motion  Ankle joint range of motion:  diminished range of motion  5/5 muscle strength         1/25/2024  left MRI forefore w/o contrast:  Impression:     Edema in the subcutaneous fat along the dorsal aspect of the foot.     Metatarsals a ductus hallux valgus deformity of the 1st digit with arthritic changes of the 1st MTP joint.

## 2024-03-08 ENCOUNTER — OFFICE VISIT (OUTPATIENT)
Dept: PODIATRY | Facility: CLINIC | Age: 74
End: 2024-03-08
Payer: MEDICARE

## 2024-03-08 VITALS
HEART RATE: 101 BPM | SYSTOLIC BLOOD PRESSURE: 169 MMHG | WEIGHT: 184 LBS | DIASTOLIC BLOOD PRESSURE: 76 MMHG | BODY MASS INDEX: 25.76 KG/M2 | HEIGHT: 71 IN

## 2024-03-08 DIAGNOSIS — I73.9 PAD (PERIPHERAL ARTERY DISEASE): ICD-10-CM

## 2024-03-08 DIAGNOSIS — M79.672 LEFT FOOT PAIN: Primary | ICD-10-CM

## 2024-03-08 DIAGNOSIS — M21.619 BUNION: ICD-10-CM

## 2024-03-08 DIAGNOSIS — M20.42 HAMMER TOE OF LEFT FOOT: ICD-10-CM

## 2024-03-08 PROCEDURE — 99213 OFFICE O/P EST LOW 20 MIN: CPT | Mod: S$GLB,,, | Performed by: PODIATRIST

## 2024-03-08 PROCEDURE — 99999 PR PBB SHADOW E&M-EST. PATIENT-LVL IV: CPT | Mod: PBBFAC,,, | Performed by: PODIATRIST

## 2024-03-12 ENCOUNTER — OFFICE VISIT (OUTPATIENT)
Dept: NEUROLOGY | Facility: CLINIC | Age: 74
End: 2024-03-12
Payer: MEDICARE

## 2024-03-12 VITALS
SYSTOLIC BLOOD PRESSURE: 180 MMHG | DIASTOLIC BLOOD PRESSURE: 90 MMHG | HEART RATE: 64 BPM | WEIGHT: 181.19 LBS | HEIGHT: 70 IN | BODY MASS INDEX: 25.94 KG/M2

## 2024-03-12 DIAGNOSIS — M79.672 LEFT FOOT PAIN: Primary | ICD-10-CM

## 2024-03-12 DIAGNOSIS — E11.42 DIABETIC POLYNEUROPATHY ASSOCIATED WITH TYPE 2 DIABETES MELLITUS: ICD-10-CM

## 2024-03-12 PROCEDURE — 99999 PR PBB SHADOW E&M-EST. PATIENT-LVL V: CPT | Mod: PBBFAC,,, | Performed by: PSYCHIATRY & NEUROLOGY

## 2024-03-12 PROCEDURE — 99213 OFFICE O/P EST LOW 20 MIN: CPT | Mod: S$GLB,,, | Performed by: PSYCHIATRY & NEUROLOGY

## 2024-03-12 RX ORDER — PREGABALIN 75 MG/1
CAPSULE ORAL
Qty: 90 CAPSULE | Refills: 11 | Status: SHIPPED | OUTPATIENT
Start: 2024-03-12

## 2024-03-12 NOTE — PROGRESS NOTES
Subjective:       Patient ID: Porsche Ring is a 73 y.o. female.    Chief Complaint: Establish Care (Her foot is getting better but took a while)      LLE venous US  Impression:     No evidence of deep venous thrombosis in the left lower extremity.      Electronically signed by: Dariana Bhakta MD  Date:                                            01/26/2024  Time:                                           10:18              MRI Foot (Forefoot) Left Without Contrast     Dx: Other acute osteomyelitis, unspecifie...    0 Result Notes  Details    Reading Physician Reading Date Result Priority  Jeremiah Caldwell MD  [phone] 1/25/2024 Routine    Narrative & Impression  EXAMINATION:  MRI FOOT (FOREFOOT) LEFT WITHOUT CONTRAST     CLINICAL HISTORY:  Osteomyelitis, foot;foot swelling and severe pain in diabetic pt. Xray ordered as well.; Other acute osteomyelitis, unspecified ankle and foot     TECHNIQUE:  A series of coronal, sagittal and axial imaging sequences were obtained of the foot.     Note: Images are limited by patient motion artifact limiting the diagnostic sensitivity and specificity of the examination.     COMPARISON:  This examination was correlated with a foot x-ray series from September 16, 2017.     FINDINGS:  Osseous structures:     There is metatarsus adductus hallux valgus deformity of the 1st digit with moderate arthritic changes of the 1st MTP joint.     There is normal fatty marrow signal intensity in the osseous structures of the foot.  There is dorsiflexion of the phalanges of the 2nd, 3rd and 4th digits at the level of the MTP joints.     Soft tissues:     There is a mild degree of edema in the subcutaneous fat along the dorsal aspect of the foot.  There is normal signal intensity in the tendinous structures along the dorsal and plantar aspects of the foot.     The collateral ligaments at the level of the metatarsophalangeal and interphalangeal joints appear to be intact.  No masses  are appreciated.     Impression:     Edema in the subcutaneous fat along the dorsal aspect of the foot.     Metatarsals a ductus hallux valgus deformity of the 1st digit with arthritic changes of the 1st MTP joint.      Electronically signed by: Jeremiah Caldwell MD  Date:                                            01/25/2024  Time:                                           14:04        MRI Ankle Without Contrast Left    Reading Physician Reading Date Result Priority  Jeremiah Caldwell MD  [phone] 1/25/2024 STAT    Narrative & Impression  EXAMINATION:  MRI ANKLE WITHOUT CONTRAST LEFT     CLINICAL HISTORY:  Osteomyelitis, ankle;r/o osteo. painful swollen hot foot in pt w/DM and PAD, recent surgery to LLE; Other acute osteomyelitis, unspecified ankle and foot     TECHNIQUE:  A series of coronal, sagittal and axial imaging sequences were obtained of the ankle.     COMPARISON:  No relevant prior imaging examinations are available for correlation.     FINDINGS:  Osseous structures:     There is normal fatty marrow signal intensity in the visualized portions of the distal tibia and fibula, the hindfoot, midfoot and proximal forefoot.     Enthesophytes are noted of the calcaneus at the attachment of the Achilles tendon and plantar fascia.  The ankle mortise is intact.     Soft tissues:     There is a mild amount of fluid in the tibiotalar joint and a small amount of fluid in the retrocalcaneal bursa.  The Achilles tendon and plantar fascia are unremarkable.     The sinus tarsi is normal.  There is edema in the subcutaneous fat of the ankle and along the dorsal aspect of the visualized portion of the foot.     The posterior tibialis, flexor digitorum longus and flexor hallucis longus tendons as well as the peroneus longus and brevis, anterior tibialis, extensor digitorum longus and extensor hallucis longus tendons are intact.     The anterior and posterior tibiofibular and talofibular ligaments as well as  the superficial and deep components of the deltoid ligament and the calcaneofibular and spring ligaments appear to be intact.     No masses are identified.     Impression:     Mild amount of edema in the subcutaneous fat of the ankle in the dorsal aspect of the foot.     Mild amount of fluid in the tibiotalar joint and a small amount of fluid in the retrocalcaneal bursa.     Enthesophytes of the calcaneus.        Electronically signed by: Jeremiah Caldwell MD  Date:                                            01/25/2024  Time:                                           13:59              EXAMINATION:  XR FOOT COMPLETE 3 VIEW LEFT     CLINICAL HISTORY:  Pain in left foot     COMPARISON:  None     FINDINGS:  Degenerative change 1st MTP joint with hallux valgus deformity.  Calcaneal enthesophytes.  Dorsal spurring midfoot.  Vascular calcifications.     Impression:     Dorsal spurring midfoot.     Hallux valgus deformity and degenerative change 1st MTP joint.     Calcaneal enthesophytes.      Electronically signed by: Leisa De Los Santos MD  Date:                                            02/14/2024  Time:                                           10:57                                  FROM NP VISIT  Assessment   1. Diabetic polyneuropathy associated with type 2 diabetes mellitus   2. Left foot pain   3. Numbness and tingling         Plan:     Plan        Subacute severe pain in the LLE in unhealthy elderly pt with DPN and PAD.  Etiology not simply DPN.     Note 3 weeks s/p successful laser atherectomy of multiple aa of the LLE   By hx the pain predated this procedure.  Exam is notable for warmth and swelling of the Lt ankle and foot ( not present in Rt foot) , allodynia and severe tenderness to palpation. She has no fever but an infection is in ddx and thus plan imaging and lab today  Note CRPS in ddx.     Will change gabapentin to lyrica and add topical lidocaine.         Past Medical History:   Diagnosis Date    Anemia      Anxiety and depression     Arthritis     Bronchitis     Bulging of lumbar intervertebral disc     Carotid bruit     Cataract     CHF (congestive heart failure)     Chronic kidney disease     Chronic pain     scoliosis    COPD (chronic obstructive pulmonary disease)     Coronary artery disease     Deep vein thrombosis     Depression     Diabetes mellitus, type 2     Diastolic heart failure     Digestive disorder     GERD    Encounter for blood transfusion     Glaucoma     Heart murmur     History of 2019 novel coronavirus disease (COVID-19) 2022    Hyperlipidemia     Hypertension     Neuropathy     Neuropathy     Pneumonia 11/10/2019    Pneumonia 2019    PVD (peripheral vascular disease)     Restless leg syndrome     Scoliosis deformity of spine     UTI (urinary tract infection)     Valvular regurgitation     mild mr    Venous insufficiency       Past Surgical History:   Procedure Laterality Date     stent left leg Left     X 5    ANGIOGRAM LEFT LEG       OR 2023 AT ANOTHER FACILITY    ANGIOGRAM, EXTREMITY, BILATERAL N/A 2023    Procedure: ANGIOGRAM, EXTREMITY, BILATERAL;  Surgeon: Gaudencio Evans MD;  Location: [facility] CATH;  Service: Cardiology;  Laterality: N/A;    CATARACT EXTRACTION Right 2017    CE IOL stent placed    CATARACT EXTRACTION Left 2017    CE IOL stent placed     SECTION      COLONOSCOPY N/A 12/10/2019    Procedure: COLONOSCOPY;  Surgeon: Luis Bogran-Reyes, MD;  Location: [facility] ENDO;  Service: Endoscopy;  Laterality: N/A;    EYE SURGERY      HYSTERECTOMY      INSERTION OF STENT INTO PERIPHERAL VESSEL Right 2024    Procedure: INSERTION, STENT, VESSEL, PERIPHERAL;  Surgeon: Gaudencio Evans MD;  Location: [facility] CATH;  Service: Cardiology;  Laterality: Right;    KNEE SURGERY Left     LEFT HEART CATHETERIZATION Left 2019    Procedure: CATHETERIZATION, HEART, LEFT;  Surgeon: Cecil Orozco MD;  Location: [facility] CATH LAB;  Service: Cardiology;   Laterality: Left;    OOPHORECTOMY  1992    PERCUTANEOUS TRANSLUMINAL ANGIOPLASTY (PTA) OF PERIPHERAL VESSEL Left 05/14/2020    Procedure: PTA, PERIPHERAL VESSEL;  Surgeon: Gaudencio Evans MD;  Location: [facility] CATH;  Service: Cardiology;  Laterality: Left;  Left popliteal intervention  w/ CO2 via R CFA    PERCUTANEOUS TRANSLUMINAL ANGIOPLASTY (PTA) OF PERIPHERAL VESSEL N/A 06/25/2020    Procedure: PTA, PERIPHERAL VESSEL;  Surgeon: Gaudencio Evans MD;  Location: [facility] CATH;  Service: Cardiology;  Laterality: N/A;   right leg intervention via the left common femoral artery    PERCUTANEOUS TRANSLUMINAL ANGIOPLASTY (PTA) OF PERIPHERAL VESSEL N/A 10/29/2020    Procedure: PTA, PERIPHERAL VESSEL and possible PVI by R CFA U&O approach;  Surgeon: Gaudencio Evans MD;  Location: [facility] CATH;  Service: Cardiology;  Laterality: N/A;    TUBAL LIGATION      ULTRASOUND GUIDANCE  08/07/2019    Procedure: Ultrasound Guidance;  Surgeon: Cecil Orozco MD;  Location: [facility] CATH LAB;  Service: Cardiology;;        Current Outpatient Medications:     albuterol (VENTOLIN HFA) 90 mcg/actuation inhaler, 1 puff as needed Inhalation every 4 hrs, Disp: , Rfl:     amLODIPine (NORVASC) 5 MG tablet, Take 1 tablet (5 mg total) by mouth once daily., Disp: 30 tablet, Rfl: 11    apixaban (ELIQUIS) 5 mg Tab, Take 1 tablet (5 mg total) by mouth 2 (two) times daily., Disp: , Rfl:     blood sugar diagnostic Strp, 1 each by Misc.(Non-Drug; Combo Route) route 3 (three) times daily., Disp: 300 strip, Rfl: 3    carvediloL (COREG) 12.5 MG tablet, Take 1 tablet (12.5 mg total) by mouth 2 (two) times daily with meals., Disp: 180 tablet, Rfl: 3    cilostazoL (PLETAL) 50 MG Tab, Take 1 tablet (50 mg total) by mouth 2 (two) times daily., Disp: 180 tablet, Rfl: 3    ciprofloxacin HCl (CIPRO) 500 MG tablet, 1 tablet Orally every 12 hrs FOR 5 DAYS, Disp: , Rfl:     clopidogreL (PLAVIX) 75 mg tablet, Take 1 tablet (75 mg total) by mouth once daily., Disp: 90 tablet, Rfl:  3    dexlansoprazole (DEXILANT) 60 mg capsule, Take 60 mg by mouth once daily., Disp: , Rfl:     DULoxetine (CYMBALTA) 20 MG capsule, Take 1 capsule by mouth once daily., Disp: , Rfl:     empagliflozin (JARDIANCE) 10 mg tablet, 1 tablet Orally Once a day for 30 day(s), Disp: , Rfl:     ergocalciferol (ERGOCALCIFEROL) 50,000 unit Cap, Take 1 capsule (50,000 Units total) by mouth every 7 days., Disp: 12 capsule, Rfl: 1    fluticasone propionate (FLONASE ALLERGY RELIEF) 50 mcg/actuation nasal spray, 1 spray in each nostril Nasally TWICE A DAY for 30 days, Disp: , Rfl:     furosemide (LASIX) 40 MG tablet, Take 40 mg by mouth once daily., Disp: , Rfl:     hydroCHLOROthiazide (HYDRODIURIL) 25 MG tablet, 1 tablet in the morning Orally Once a day for 30 days, Disp: , Rfl:     HYDROcodone-acetaminophen (NORCO)  mg per tablet, Take 1 tablet by mouth every 12 (twelve) hours as needed for Pain., Disp: , Rfl:     insulin degludec (TRESIBA FLEXTOUCH U-200) 200 unit/mL (3 mL) insulin pen, Inject 52 Units into the skin once daily., Disp: , Rfl:     lancets 32 gauge Misc, 1 lancet by Misc.(Non-Drug; Combo Route) route 3 (three) times daily before meals., Disp: 300 each, Rfl: 3    latanoprost 0.005 % ophthalmic solution, 1 drop every evening., Disp: , Rfl:     linaGLIPtin (TRADJENTA) 5 mg Tab tablet, Take 1 tablet (5 mg total) by mouth once daily., Disp: 90 tablet, Rfl: 1    losartan (COZAAR) 25 MG tablet, Take 25 mg by mouth every evening., Disp: , Rfl:     mv-min-FA-D3-om3-dha-epa-fish 200 mcg-500 unit-200 mg Cap, Take 2 capsules by mouth once daily., Disp: , Rfl:     ranolazine (RANEXA) 500 MG Tb12, Take 500 mg by mouth 2 (two) times daily. NOON AND NIGHTLY, Disp: , Rfl:     rosuvastatin (CRESTOR) 40 MG Tab, TAKE 1 TABLET(40 MG) BY MOUTH EVERY EVENING FOR CHOLESTEROL, Disp: 90 tablet, Rfl: 3    SYMBICORT 160-4.5 mcg/actuation HFAA, 2 puffs Inhalation Twice a day for 30 days, Disp: , Rfl:     tamsulosin (FLOMAX) 0.4 mg Cap, 1  capsule., Disp: , Rfl:     blood-glucose meter (TRUE METRIX AIR GLUCOSE METER) kit, True metrix meter kit; Use as instructed, Disp: 1 each, Rfl: 0    LIDOcaine 5 % Gel, Apply to both feet twice a day as needed, Disp: 30 g, Rfl: 11    pregabalin (LYRICA) 75 MG capsule, One po mid day and 2 po qhs, Disp: 90 capsule, Rfl: 11   Review of patient's allergies indicates:   Allergen Reactions    Jardiance [empagliflozin] Other (See Comments)     UTI     Lisinopril Swelling    Metformin Other (See Comments)     CKD/HF        Review of Systems   Neurological:  Negative for dizziness.   Psychiatric/Behavioral:  Negative for sleep disturbance (she is very pleased.).            Objective:      Physical Exam  Constitutional:       General: She is not in acute distress.     Appearance: She is not ill-appearing.   Neurological:      Mental Status: She is alert.      Gait: Gait (no AD) normal.           Assessment:       1. Left foot pain    2. Diabetic polyneuropathy associated with type 2 diabetes mellitus        Plan:         Here for 1st f/u of subacute severe Lt foot pain after a successful revascularization procedure in pt with PAD, DPN, and foot OA. ESR 35, WBC 7.64, imaging in HPI    She is markedly improved. The only intervention was to change gabapentin to lyrica. Has no SE and is sleeping very well now. She is very pleased. Has no new symptoms to report.  Etiology unclear to me. Imaging revealed swelling, as was obvious on exam, but no evidence of infection or of a DVT.  Findings were typical of CPRS but spontaneous resolution, and in such a short time period, is unusual.     If baseline DPN pain worsens then next step would be to increase cymbalta ( currently takes 20mg qhs)  Can f/u w/me PRN.               Skylar Willis MD   03/12/2024   9:25 AM

## 2024-03-12 NOTE — PATIENT INSTRUCTIONS
When you feel like your foot is inflamed, ie due to arthritis, try over the counter diclofenac ( up to 4 times a day). Then apply the lidocaine over the diclofenac.     If your diabetic nerve pain gets out of control, let me know; the next step will be to increase the duloxetine.

## 2024-03-13 RX ORDER — LIDOCAINE 40 MG/G
CREAM TOPICAL
Qty: 30 G | Refills: 3 | Status: SHIPPED | OUTPATIENT
Start: 2024-03-13

## 2024-03-13 RX ORDER — LIDOCAINE 40 MG/G
CREAM TOPICAL
Qty: 30 G | Refills: 3 | Status: SHIPPED | OUTPATIENT
Start: 2024-03-13 | End: 2024-03-13 | Stop reason: SDUPTHER

## 2024-03-19 NOTE — PROGRESS NOTES
PODIATRY    PATIENT ID:  Porsche Ring is a 73 y.o. female.     CHIEF CONCERN:   Follow-up (Left Foot Wound Care)         MEDICAL DECISION MAKING:      Problem List Items Addressed This Visit       PAD (peripheral artery disease)    Overview     S/p 3 stents LLE          Other Visit Diagnoses       Left foot pain    -  Primary    Hammer toe of left foot        Bunion                  I counseled the patient on the patient's conditions, their implications and medical management.    Imaging reviewed.   Shoe and activity modification as needed for relief.     Continue conservative treatment for foot pain, not good candidate for bunion and hammertoe surgery due to PVD.    OTC topical NSAIDS as needed pain      I spent a total of 20 minutes on the day of the visit.  This includes face to face time and non-face to face time preparing to see the patient (eg, review of tests), obtaining and/or reviewing separately obtained history, documenting clinical information in the electronic or other health record, independently interpreting results and communicating results to the patient/family/caregiver, or care coordinator.        HISTORY OF PRESENT ILLNESS:  Porsche Ring is a 73 y.o. female with concerns regarding: left foot.  She has PVD s/p stent insertion.   Patient reports symptoms/signs have been present since January.   Trauma/injury to the area:  none recalled.         Patient Active Problem List   Diagnosis    Primary hypertension    HLD (hyperlipidemia)    PAD (peripheral artery disease)    CKD (chronic kidney disease) stage 3, GFR 30-59 ml/min    Glaucoma    S/P peripheral artery angioplasty with stent placement    Mild mitral regurgitation    Left ventricular hypertrophy    Diabetic neuropathy    Hammer toes of both feet    Knee injury    Stable angina    Hx of colonic polyps    Heart failure with preserved ejection fraction, NYHA class II    Coronary artery disease of native artery of native heart with stable  angina pectoris    Diabetes mellitus, type 2    Left atrial enlargement    Stenosis of left carotid artery    Claudication    ACE inhibitor-aggravated angioedema    Peripheral arterial disease    Dyspnea on exertion    Nonrheumatic tricuspid valve regurgitation    Mitral valve sclerosis    Sinus bradycardia    Nonspecific abnormal electrocardiogram (ECG) (EKG)    Stenosis of right carotid artery    Onychomycosis of multiple toenails with type 2 diabetes mellitus and peripheral neuropathy    Hyperlipidemia associated with type 2 diabetes mellitus    BMI 26.0-26.9,adult    Vitamin D deficiency       Current Outpatient Medications on File Prior to Visit   Medication Sig Dispense Refill    albuterol (VENTOLIN HFA) 90 mcg/actuation inhaler 1 puff as needed Inhalation every 4 hrs      amLODIPine (NORVASC) 5 MG tablet Take 1 tablet (5 mg total) by mouth once daily. 30 tablet 11    apixaban (ELIQUIS) 5 mg Tab Take 1 tablet (5 mg total) by mouth 2 (two) times daily.      blood sugar diagnostic Strp 1 each by Misc.(Non-Drug; Combo Route) route 3 (three) times daily. 300 strip 3    carvediloL (COREG) 12.5 MG tablet Take 1 tablet (12.5 mg total) by mouth 2 (two) times daily with meals. 180 tablet 3    cilostazoL (PLETAL) 50 MG Tab Take 1 tablet (50 mg total) by mouth 2 (two) times daily. 180 tablet 3    ciprofloxacin HCl (CIPRO) 500 MG tablet 1 tablet Orally every 12 hrs FOR 5 DAYS      clopidogreL (PLAVIX) 75 mg tablet Take 1 tablet (75 mg total) by mouth once daily. 90 tablet 3    dexlansoprazole (DEXILANT) 60 mg capsule Take 60 mg by mouth once daily.      DULoxetine (CYMBALTA) 20 MG capsule Take 1 capsule by mouth once daily.      empagliflozin (JARDIANCE) 10 mg tablet 1 tablet Orally Once a day for 30 day(s)      ergocalciferol (ERGOCALCIFEROL) 50,000 unit Cap Take 1 capsule (50,000 Units total) by mouth every 7 days. 12 capsule 1    fluticasone propionate (FLONASE ALLERGY RELIEF) 50 mcg/actuation nasal spray 1 spray in  each nostril Nasally TWICE A DAY for 30 days      furosemide (LASIX) 40 MG tablet Take 40 mg by mouth once daily.      hydroCHLOROthiazide (HYDRODIURIL) 25 MG tablet 1 tablet in the morning Orally Once a day for 30 days      HYDROcodone-acetaminophen (NORCO)  mg per tablet Take 1 tablet by mouth every 12 (twelve) hours as needed for Pain.      insulin degludec (TRESIBA FLEXTOUCH U-200) 200 unit/mL (3 mL) insulin pen Inject 52 Units into the skin once daily.      lancets 32 gauge Misc 1 lancet by Misc.(Non-Drug; Combo Route) route 3 (three) times daily before meals. 300 each 3    latanoprost 0.005 % ophthalmic solution 1 drop every evening.      linaGLIPtin (TRADJENTA) 5 mg Tab tablet Take 1 tablet (5 mg total) by mouth once daily. 90 tablet 1    losartan (COZAAR) 25 MG tablet Take 25 mg by mouth every evening.      mv-min-FA-D3-om3-dha-epa-fish 200 mcg-500 unit-200 mg Cap Take 2 capsules by mouth once daily.      ranolazine (RANEXA) 500 MG Tb12 Take 500 mg by mouth 2 (two) times daily. NOON AND NIGHTLY      rosuvastatin (CRESTOR) 40 MG Tab TAKE 1 TABLET(40 MG) BY MOUTH EVERY EVENING FOR CHOLESTEROL 90 tablet 3    SYMBICORT 160-4.5 mcg/actuation HFAA 2 puffs Inhalation Twice a day for 30 days      tamsulosin (FLOMAX) 0.4 mg Cap 1 capsule.      blood-glucose meter (TRUE METRIX AIR GLUCOSE METER) kit True metrix meter kit; Use as instructed 1 each 0     No current facility-administered medications on file prior to visit.       Review of patient's allergies indicates:   Allergen Reactions    Jardiance [empagliflozin] Other (See Comments)     UTI     Lisinopril Swelling    Metformin Other (See Comments)     CKD/HF         ROS:   General ROS: negative for - chills, fever or night sweats  Respiratory ROS: no cough, shortness of breath, or wheezing  Cardiovascular ROS: no chest pain or dyspnea on exertion      EXAM:     Vitals:    03/08/24 0927   BP: (!) 169/76   Pulse: 101   Weight: 83.5 kg (184 lb)   Height: 5'  "10.98" (1.803 m)        General:  Alert and Oriented x 3;  No acute distress    Vascular:   Dorsalis Pedis:  present   Posterior Tibial:  present  Capillary refill time:  3 seconds  Temperature of toes warm to touch  Edema:  1+       Neurological:     Sharp touch:  normal  Light touch: normal  Tinels Sign:  Absent  Mulders Click:   Absent      Dermatological:   Skin: thin, atrophic, and shiny  Wounds/Ulcers:  Absent  Bruising:  Absent  Erythema:  Absent      Musculoskeletal:   Metatarsophalangeal range of motion:   diminished range of motion  Subtalar joint range of motion: diminished range of motion  Ankle joint range of motion:  diminished range of motion  5/5 muscle strength         1/25/2024  left MRI forefore w/o contrast:  Impression:     Edema in the subcutaneous fat along the dorsal aspect of the foot.     Metatarsals a ductus hallux valgus deformity of the 1st digit with arthritic changes of the 1st MTP joint.  "

## 2024-05-17 ENCOUNTER — OFFICE VISIT (OUTPATIENT)
Dept: PODIATRY | Facility: CLINIC | Age: 74
End: 2024-05-17
Payer: MEDICARE

## 2024-05-17 VITALS
HEART RATE: 71 BPM | WEIGHT: 184.75 LBS | HEIGHT: 70 IN | SYSTOLIC BLOOD PRESSURE: 182 MMHG | BODY MASS INDEX: 26.45 KG/M2 | DIASTOLIC BLOOD PRESSURE: 69 MMHG

## 2024-05-17 DIAGNOSIS — M79.675 TOE PAIN, LEFT: ICD-10-CM

## 2024-05-17 DIAGNOSIS — M21.619 BUNION: ICD-10-CM

## 2024-05-17 DIAGNOSIS — M79.672 LEFT FOOT PAIN: Primary | ICD-10-CM

## 2024-05-17 DIAGNOSIS — M20.42 HAMMER TOE OF LEFT FOOT: ICD-10-CM

## 2024-05-17 PROCEDURE — 1159F MED LIST DOCD IN RCRD: CPT | Mod: CPTII,S$GLB,, | Performed by: PODIATRIST

## 2024-05-17 PROCEDURE — 3052F HG A1C>EQUAL 8.0%<EQUAL 9.0%: CPT | Mod: CPTII,S$GLB,, | Performed by: PODIATRIST

## 2024-05-17 PROCEDURE — 1101F PT FALLS ASSESS-DOCD LE1/YR: CPT | Mod: CPTII,S$GLB,, | Performed by: PODIATRIST

## 2024-05-17 PROCEDURE — 99999 PR PBB SHADOW E&M-EST. PATIENT-LVL V: CPT | Mod: PBBFAC,,, | Performed by: PODIATRIST

## 2024-05-17 PROCEDURE — 3008F BODY MASS INDEX DOCD: CPT | Mod: CPTII,S$GLB,, | Performed by: PODIATRIST

## 2024-05-17 PROCEDURE — 3077F SYST BP >= 140 MM HG: CPT | Mod: CPTII,S$GLB,, | Performed by: PODIATRIST

## 2024-05-17 PROCEDURE — 1160F RVW MEDS BY RX/DR IN RCRD: CPT | Mod: CPTII,S$GLB,, | Performed by: PODIATRIST

## 2024-05-17 PROCEDURE — 1126F AMNT PAIN NOTED NONE PRSNT: CPT | Mod: CPTII,S$GLB,, | Performed by: PODIATRIST

## 2024-05-17 PROCEDURE — 3288F FALL RISK ASSESSMENT DOCD: CPT | Mod: CPTII,S$GLB,, | Performed by: PODIATRIST

## 2024-05-17 PROCEDURE — 3078F DIAST BP <80 MM HG: CPT | Mod: CPTII,S$GLB,, | Performed by: PODIATRIST

## 2024-05-17 PROCEDURE — 4010F ACE/ARB THERAPY RXD/TAKEN: CPT | Mod: CPTII,S$GLB,, | Performed by: PODIATRIST

## 2024-05-17 PROCEDURE — 99213 OFFICE O/P EST LOW 20 MIN: CPT | Mod: S$GLB,,, | Performed by: PODIATRIST

## 2024-05-17 RX ORDER — PREDNISONE 5 MG/1
TABLET ORAL
COMMUNITY
Start: 2024-03-11

## 2024-05-17 RX ORDER — PREDNISONE 10 MG/1
TABLET ORAL
COMMUNITY
Start: 2024-03-11 | End: 2024-06-03

## 2024-05-17 RX ORDER — DOXYCYCLINE HYCLATE 100 MG
100 TABLET ORAL
COMMUNITY
Start: 2024-03-12

## 2024-05-17 RX ORDER — DOXYCYCLINE 100 MG/1
1 CAPSULE ORAL
COMMUNITY
Start: 2024-03-11 | End: 2024-06-03

## 2024-05-17 RX ORDER — PREDNISONE 10 MG/1
10 TABLET ORAL
COMMUNITY
Start: 2024-03-12 | End: 2024-06-03

## 2024-05-17 NOTE — PATIENT INSTRUCTIONS
How to Check Your Feet    Below are tips to help you look for foot problems. Try to check your feet at the same time each day, such as when you get out of bed in the morning.    Check the top of each foot. The tops of toes, back of the heel, and outer edge of the foot can get a lot of rubbing from poor-fitting shoes.    Check the bottom of each foot. Daily wear and tear often leads to problems at pressure spots.    Check the toes and nails. Fungal infections often occur between toes. Toenail problems can also be a sign of fungal infections or lead to breaks in the skin.    Check your shoes, too. Loose objects inside a shoe can injure the foot. Use your hand to feel inside your shoes for things like jamie, loose stitching, or rough areas that could irritate your skin.        Diabetic Foot Care    Diabetes can lead to a number of different foot complications. Fortunately, most of these complications can be prevented with a little extra foot care. If diabetes is not well controlled, the high blood sugar can cause damage to blood vessels and result in poor circulation to the foot. When the skin does not get enough blood flow, it becomes prone to pressure sores and ulcers, which heal slowly.  High blood sugar can also damage nerves, interfering with the ability to feel pain and pressure. When you cant feel your foot normally, it is easy to injure your skin, bones and joints without knowing it. For these reasons diabetes increases the risk of fungal infections, bunions and ulcers. Deep ulcers can lead to bone infection. Gangrene is the most serious foot complication of diabetes. It usually occurs on the tips of the toes as blacked areas of skin. The black area is dead tissue. In severe cases, gangrene spreads to involve the entire toe, other toes and the entire foot. Foot or toe amputation may be required. Good foot care and blood sugar control can prevent this.    Home Care  Wear comfortable, proper fitting  shoes.  Wash your feet daily with warm water and mild soap.  After drying, apply a moisturizing cream or lotion.  Check your feet daily for skin breaks, blisters, swelling, or redness. Look between your toes also.  Wear cotton socks and change them every day.  Trim toe nails carefully and do not cut your cuticles.  Strive to keep your blood sugar under control with a combination of medicines, diet and activity.  If you smoke and have diabetes, it is very important that you stop. Smoking reduces blood flow to your foot.  Avoid activities that increase your risk of foot injury:  Do not walk barefoot.  Do not use heating pads or hot water bottles on your feet.  Do not put your foot in a hot tub without first checking the temperature with your hand.  10) Schedule yearly foot exams.    Follow Up  with your doctor or as advised by our staff. Report any cut, puncture, scrape, other injury, blister, ingrown toenail or ulcer on your foot.    Get Prompt Medical Attention  if any of the following occur:  -- Open ulcer with pus draining from the wound  -- Increasing foot or leg pain  -- New areas of redness or swelling or tender areas of the foot    © 0079-7544 [employer]. [address] [place] [address], [place] [address]. All rights reserved. This information is not intended as a substitute for professional medical care. Always follow your healthcare professional's instructions.

## 2024-05-28 ENCOUNTER — HOSPITAL ENCOUNTER (OUTPATIENT)
Dept: RADIOLOGY | Facility: HOSPITAL | Age: 74
Discharge: HOME | End: 2024-05-28
Attending: PODIATRIST
Payer: MEDICARE

## 2024-05-28 DIAGNOSIS — M79.672 LEFT FOOT PAIN: ICD-10-CM

## 2024-05-28 PROCEDURE — 73630 X-RAY EXAM OF FOOT: CPT | Mod: TC,FY,LT

## 2024-05-28 PROCEDURE — 73630 X-RAY EXAM OF FOOT: CPT | Mod: 26,LT,, | Performed by: RADIOLOGY

## 2024-05-29 NOTE — PROGRESS NOTES
PODIATRY    PATIENT ID:  Porsche Ring is a 74 y.o. female.     CHIEF CONCERN:   Diabetic Foot Exam (Foot Exam/PCP Javier Evans  05/13/24)         MEDICAL DECISION MAKING:      Problem List Items Addressed This Visit    None  Visit Diagnoses       Left foot pain    -  Primary    Relevant Orders    X-Ray Foot Complete Left (Completed)    Hammer toe of left foot        Bunion        Toe pain, left        Left 4th              I counseled the patient on the patient's conditions, their implications and medical management.    She bumped her LEFT 4th toe a week ago.  Toe is swollen.   Xray LEFT foot.  Follow up for review.   Shoe and activity modification as needed for relief in the meantime.   Continue conservative treatment for foot pain, not good candidate for bunion and hammertoe surgery due to PVD.    OTC topical NSAIDS as needed pain      I spent a total of 22 minutes on the day of the visit.  This includes face to face time and non-face to face time preparing to see the patient (eg, review of tests), obtaining and/or reviewing separately obtained history, documenting clinical information in the electronic or other health record, independently interpreting results and communicating results to the patient/family/caregiver, or care coordinator.        HISTORY OF PRESENT ILLNESS:  Porsche Ring is a 74 y.o. female with concerns regarding: left foot 4th toe.  She bumped her LEFT foot about a week ago.  She has PVD s/p stent insertion.       Patient Active Problem List   Diagnosis    Primary hypertension    HLD (hyperlipidemia)    PAD (peripheral artery disease)    CKD (chronic kidney disease) stage 3, GFR 30-59 ml/min    Glaucoma    S/P peripheral artery angioplasty with stent placement    Mild mitral regurgitation    Left ventricular hypertrophy    Diabetic neuropathy    Hammer toes of both feet    Knee injury    Stable angina    Hx of colonic polyps    Heart failure with preserved ejection fraction,  NYHA class II    Coronary artery disease of native artery of native heart with stable angina pectoris    Diabetes mellitus, type 2    Left atrial enlargement    Stenosis of left carotid artery    Claudication    ACE inhibitor-aggravated angioedema    Peripheral arterial disease    Dyspnea on exertion    Nonrheumatic tricuspid valve regurgitation    Mitral valve sclerosis    Sinus bradycardia    Nonspecific abnormal electrocardiogram (ECG) (EKG)    Stenosis of right carotid artery    Onychomycosis of multiple toenails with type 2 diabetes mellitus and peripheral neuropathy    Hyperlipidemia associated with type 2 diabetes mellitus    BMI 26.0-26.9,adult    Vitamin D deficiency       Current Outpatient Medications on File Prior to Visit   Medication Sig Dispense Refill    albuterol (VENTOLIN HFA) 90 mcg/actuation inhaler 1 puff as needed Inhalation every 4 hrs      amLODIPine (NORVASC) 5 MG tablet Take 1 tablet (5 mg total) by mouth once daily. 30 tablet 11    apixaban (ELIQUIS) 5 mg Tab Take 1 tablet (5 mg total) by mouth 2 (two) times daily.      blood sugar diagnostic Strp 1 each by Misc.(Non-Drug; Combo Route) route 3 (three) times daily. 300 strip 3    carvediloL (COREG) 12.5 MG tablet Take 1 tablet (12.5 mg total) by mouth 2 (two) times daily with meals. 180 tablet 3    cilostazoL (PLETAL) 50 MG Tab Take 1 tablet (50 mg total) by mouth 2 (two) times daily. 180 tablet 3    ciprofloxacin HCl (CIPRO) 500 MG tablet 1 tablet Orally every 12 hrs FOR 5 DAYS      clopidogreL (PLAVIX) 75 mg tablet Take 1 tablet (75 mg total) by mouth once daily. 90 tablet 3    dexlansoprazole (DEXILANT) 60 mg capsule Take 60 mg by mouth once daily.      doxycycline (VIBRA-TABS) 100 MG tablet Take 100 mg by mouth.      doxycycline (VIBRAMYCIN) 100 MG Cap 1 capsule.      DULoxetine (CYMBALTA) 20 MG capsule Take 1 capsule by mouth once daily.      empagliflozin (JARDIANCE) 10 mg tablet 1 tablet Orally Once a day for 30 day(s)       ergocalciferol (ERGOCALCIFEROL) 50,000 unit Cap Take 1 capsule (50,000 Units total) by mouth every 7 days. 12 capsule 1    fluticasone propionate (FLONASE ALLERGY RELIEF) 50 mcg/actuation nasal spray 1 spray in each nostril Nasally TWICE A DAY for 30 days      furosemide (LASIX) 40 MG tablet Take 40 mg by mouth once daily.      hydroCHLOROthiazide (HYDRODIURIL) 25 MG tablet 1 tablet in the morning Orally Once a day for 30 days      HYDROcodone-acetaminophen (NORCO)  mg per tablet Take 1 tablet by mouth every 12 (twelve) hours as needed for Pain.      insulin degludec (TRESIBA FLEXTOUCH U-200) 200 unit/mL (3 mL) insulin pen Inject 52 Units into the skin once daily.      lancets 32 gauge Misc 1 lancet by Misc.(Non-Drug; Combo Route) route 3 (three) times daily before meals. 300 each 3    latanoprost 0.005 % ophthalmic solution 1 drop every evening.      LIDOcaine (LMX) 4 % cream APPLY TOPICALLY TO BOTH FEET TWICE DAILY AS NEEDED 30 g 3    linaGLIPtin (TRADJENTA) 5 mg Tab tablet Take 1 tablet (5 mg total) by mouth once daily. 90 tablet 1    losartan (COZAAR) 25 MG tablet Take 25 mg by mouth every evening.      mv-min-FA-D3-om3-dha-epa-fish 200 mcg-500 unit-200 mg Cap Take 2 capsules by mouth once daily.      predniSONE (DELTASONE) 10 mg tablet pack 1 tablet Orally Once a day for 5 days      predniSONE (DELTASONE) 10 MG tablet Take 10 mg by mouth.      predniSONE (DELTASONE) 5 MG tablet 1 tablet Orally Once a day for 5 days      pregabalin (LYRICA) 75 MG capsule One po mid day and 2 po qhs 90 capsule 11    ranolazine (RANEXA) 500 MG Tb12 Take 500 mg by mouth 2 (two) times daily. NOON AND NIGHTLY      rosuvastatin (CRESTOR) 40 MG Tab TAKE 1 TABLET(40 MG) BY MOUTH EVERY EVENING FOR CHOLESTEROL 90 tablet 3    SYMBICORT 160-4.5 mcg/actuation HFAA 2 puffs Inhalation Twice a day for 30 days      tamsulosin (FLOMAX) 0.4 mg Cap 1 capsule.      terbinafine HCL (LAMISIL) 250 mg tablet Take 1 tablet (250 mg total) by mouth  "once daily. 90 tablet 1    blood-glucose meter (TRUE METRIX AIR GLUCOSE METER) kit True metrix meter kit; Use as instructed 1 each 0     No current facility-administered medications on file prior to visit.       Review of patient's allergies indicates:   Allergen Reactions    Jardiance [empagliflozin] Other (See Comments)     UTI     Lisinopril Swelling    Metformin Other (See Comments)     CKD/HF         ROS:   General ROS: negative for - chills, fever or night sweats  Respiratory ROS: no cough, shortness of breath, or wheezing  Cardiovascular ROS: no chest pain or dyspnea on exertion      EXAM:     Vitals:    05/17/24 0858   BP: (!) 182/69   Pulse: 71   Weight: 83.8 kg (184 lb 11.9 oz)   Height: 5' 10" (1.778 m)        General:  Alert and Oriented x 3;  No acute distress    Vascular:   Dorsalis Pedis:  present   Posterior Tibial:  present  Capillary refill time:  3 seconds  Temperature of toes warm to touch  Edema:  1+       Neurological:     Sharp touch:  normal  Light touch: normal  Tinels Sign:  Absent  Mulders Click:   Absent      Dermatological:   Skin: thin, atrophic, and shiny  Wounds/Ulcers:  Absent  Bruising:  LEFT 4th toe  Erythema:  Absent      Musculoskeletal:   Metatarsophalangeal range of motion:   diminished range of motion  Subtalar joint range of motion: diminished range of motion  Ankle joint range of motion:  diminished range of motion  5/5 muscle strength  tenderness to palpation left 4th toe with interphalangeal joint range of motion          1/25/2024  left MRI forefore w/o contrast:  Impression:   Edema in the subcutaneous fat along the dorsal aspect of the foot.   Metatarsals a ductus hallux valgus deformity of the 1st digit with arthritic changes of the 1st MTP joint.  "

## 2024-05-31 ENCOUNTER — OFFICE VISIT (OUTPATIENT)
Dept: PODIATRY | Facility: CLINIC | Age: 74
End: 2024-05-31
Payer: MEDICARE

## 2024-05-31 VITALS
WEIGHT: 184.75 LBS | HEART RATE: 80 BPM | DIASTOLIC BLOOD PRESSURE: 75 MMHG | BODY MASS INDEX: 26.45 KG/M2 | SYSTOLIC BLOOD PRESSURE: 148 MMHG | HEIGHT: 70 IN

## 2024-05-31 DIAGNOSIS — S92.505D CLOSED NONDISPLACED FRACTURE OF PHALANX OF LESSER TOE OF LEFT FOOT WITH ROUTINE HEALING, UNSPECIFIED PHALANX, SUBSEQUENT ENCOUNTER: Primary | ICD-10-CM

## 2024-05-31 DIAGNOSIS — M79.675 TOE PAIN, LEFT: ICD-10-CM

## 2024-05-31 PROCEDURE — 1101F PT FALLS ASSESS-DOCD LE1/YR: CPT | Mod: CPTII,S$GLB,, | Performed by: PODIATRIST

## 2024-05-31 PROCEDURE — 3077F SYST BP >= 140 MM HG: CPT | Mod: CPTII,S$GLB,, | Performed by: PODIATRIST

## 2024-05-31 PROCEDURE — 1160F RVW MEDS BY RX/DR IN RCRD: CPT | Mod: CPTII,S$GLB,, | Performed by: PODIATRIST

## 2024-05-31 PROCEDURE — 99212 OFFICE O/P EST SF 10 MIN: CPT | Mod: S$GLB,,, | Performed by: PODIATRIST

## 2024-05-31 PROCEDURE — 1126F AMNT PAIN NOTED NONE PRSNT: CPT | Mod: CPTII,S$GLB,, | Performed by: PODIATRIST

## 2024-05-31 PROCEDURE — 1159F MED LIST DOCD IN RCRD: CPT | Mod: CPTII,S$GLB,, | Performed by: PODIATRIST

## 2024-05-31 PROCEDURE — 3288F FALL RISK ASSESSMENT DOCD: CPT | Mod: CPTII,S$GLB,, | Performed by: PODIATRIST

## 2024-05-31 PROCEDURE — 3052F HG A1C>EQUAL 8.0%<EQUAL 9.0%: CPT | Mod: CPTII,S$GLB,, | Performed by: PODIATRIST

## 2024-05-31 PROCEDURE — 99999 PR PBB SHADOW E&M-EST. PATIENT-LVL IV: CPT | Mod: PBBFAC,,, | Performed by: PODIATRIST

## 2024-05-31 PROCEDURE — 3008F BODY MASS INDEX DOCD: CPT | Mod: CPTII,S$GLB,, | Performed by: PODIATRIST

## 2024-05-31 PROCEDURE — 4010F ACE/ARB THERAPY RXD/TAKEN: CPT | Mod: CPTII,S$GLB,, | Performed by: PODIATRIST

## 2024-05-31 PROCEDURE — 3078F DIAST BP <80 MM HG: CPT | Mod: CPTII,S$GLB,, | Performed by: PODIATRIST

## 2024-06-11 NOTE — PROGRESS NOTES
PODIATRY    PATIENT ID:  Porsche Ring is a 74 y.o. female.     CHIEF CONCERN:   Follow-up (Xray Results)         MEDICAL DECISION MAKING:      Problem List Items Addressed This Visit    None  Visit Diagnoses       Closed nondisplaced fracture of phalanx of lesser toe of left foot with routine healing, unspecified phalanx, subsequent encounter    -  Primary    Toe pain, left                    I counseled the patient on the patient's conditions, their implications and medical management.    I ordered xrays and reviewed the xrays with the patient.  Toe fx, non displaced.   Impression:  nondisplaced fracture of the proximal phalanx of the 4th digit.  fracture shoe x 2 more weeks.  Rest and elevate.  OTC topical NSAIDS as needed pain  Call or return to clinic prn if these symptoms worsen or fail to improve as anticipated.             HISTORY OF PRESENT ILLNESS:  Porsche Ring is a 74 y.o. female with concerns regarding: left foot 4th toe.  She bumped her LEFT foot about a week ago.  She has PVD s/p stent insertion.     5/31/24  follow up LEFT foot pain and xray review.   Pain improving.       Patient Active Problem List   Diagnosis    Primary hypertension    HLD (hyperlipidemia)    PAD (peripheral artery disease)    Stage 3a chronic kidney disease    Glaucoma    S/P peripheral artery angioplasty with stent placement    Mild mitral regurgitation    Left ventricular hypertrophy    Diabetic neuropathy    Hammer toes of both feet    Knee injury    Stable angina    Hx of colonic polyps    Heart failure with preserved ejection fraction, NYHA class II    Coronary artery disease of native artery of native heart with stable angina pectoris    Diabetes mellitus, type 2    Left atrial enlargement    Stenosis of left carotid artery    Claudication    ACE inhibitor-aggravated angioedema    Peripheral arterial disease    Dyspnea on exertion    Nonrheumatic tricuspid valve regurgitation    Mitral valve sclerosis    Sinus  bradycardia    Nonspecific abnormal electrocardiogram (ECG) (EKG)    Stenosis of right carotid artery    Onychomycosis of multiple toenails with type 2 diabetes mellitus and peripheral neuropathy    Hyperlipidemia associated with type 2 diabetes mellitus    BMI 26.0-26.9,adult    Vitamin D deficiency       Current Outpatient Medications on File Prior to Visit   Medication Sig Dispense Refill    albuterol (VENTOLIN HFA) 90 mcg/actuation inhaler 1 puff as needed Inhalation every 4 hrs      amLODIPine (NORVASC) 5 MG tablet Take 1 tablet (5 mg total) by mouth once daily. (Patient taking differently: Take 25 mg by mouth once daily.) 30 tablet 11    apixaban (ELIQUIS) 5 mg Tab Take 1 tablet (5 mg total) by mouth 2 (two) times daily.      carvediloL (COREG) 12.5 MG tablet Take 1 tablet (12.5 mg total) by mouth 2 (two) times daily with meals. 180 tablet 3    cilostazoL (PLETAL) 50 MG Tab Take 1 tablet (50 mg total) by mouth 2 (two) times daily. 180 tablet 3    ciprofloxacin HCl (CIPRO) 500 MG tablet 1 tablet Orally every 12 hrs FOR 5 DAYS      clopidogreL (PLAVIX) 75 mg tablet Take 1 tablet (75 mg total) by mouth once daily. 90 tablet 3    dexlansoprazole (DEXILANT) 60 mg capsule Take 60 mg by mouth once daily.      doxycycline (VIBRA-TABS) 100 MG tablet Take 100 mg by mouth.      DULoxetine (CYMBALTA) 20 MG capsule Take 1 capsule by mouth once daily.      fluticasone propionate (FLONASE ALLERGY RELIEF) 50 mcg/actuation nasal spray 1 spray in each nostril Nasally TWICE A DAY for 30 days      furosemide (LASIX) 40 MG tablet Take 40 mg by mouth once daily.      hydroCHLOROthiazide (HYDRODIURIL) 25 MG tablet 1 tablet in the morning Orally Once a day for 30 days      HYDROcodone-acetaminophen (NORCO)  mg per tablet Take 1 tablet by mouth every 12 (twelve) hours as needed for Pain.      latanoprost 0.005 % ophthalmic solution 1 drop every evening.      LIDOcaine (LMX) 4 % cream APPLY TOPICALLY TO BOTH FEET TWICE DAILY AS  "NEEDED 30 g 3    losartan (COZAAR) 25 MG tablet Take 25 mg by mouth every evening.      mv-min-FA-D3-om3-dha-epa-fish 200 mcg-500 unit-200 mg Cap Take 2 capsules by mouth once daily.      predniSONE (DELTASONE) 5 MG tablet 1 tablet Orally Once a day for 5 days (Patient not taking: Reported on 6/3/2024)      pregabalin (LYRICA) 75 MG capsule One po mid day and 2 po qhs 90 capsule 11    ranolazine (RANEXA) 500 MG Tb12 Take 500 mg by mouth 2 (two) times daily. NOON AND NIGHTLY      rosuvastatin (CRESTOR) 40 MG Tab TAKE 1 TABLET(40 MG) BY MOUTH EVERY EVENING FOR CHOLESTEROL 90 tablet 3    SYMBICORT 160-4.5 mcg/actuation HFAA 2 puffs Inhalation Twice a day for 30 days      tamsulosin (FLOMAX) 0.4 mg Cap 1 capsule.      terbinafine HCL (LAMISIL) 250 mg tablet Take 1 tablet (250 mg total) by mouth once daily. 90 tablet 1    blood-glucose meter (TRUE METRIX AIR GLUCOSE METER) kit True metrix meter kit; Use as instructed 1 each 0     No current facility-administered medications on file prior to visit.       Review of patient's allergies indicates:   Allergen Reactions    Jardiance [empagliflozin] Other (See Comments)     UTI     Lisinopril Swelling    Metformin Other (See Comments)     CKD/HF         ROS:   General ROS: negative for - chills, fever or night sweats  Respiratory ROS: no cough, shortness of breath, or wheezing  Cardiovascular ROS: no chest pain or dyspnea on exertion      EXAM:     Vitals:    05/31/24 0930   BP: (!) 148/75   Pulse: 80   Weight: 83.8 kg (184 lb 11.9 oz)   Height: 5' 10" (1.778 m)        General:  Alert and Oriented x 3;  No acute distress    Vascular:   Dorsalis Pedis:  present   Posterior Tibial:  present  Capillary refill time:  3 seconds  Temperature of toes warm to touch  Edema:  1+       Neurological:     Sharp touch:  normal  Light touch: normal  Tinels Sign:  Absent  Mulders Click:   Absent      Dermatological:   Skin: thin, atrophic, and shiny  Wounds/Ulcers:  Absent  Bruising:  LEFT 4th " toe  Erythema:  Absent      Musculoskeletal:   Metatarsophalangeal range of motion:   diminished range of motion  Subtalar joint range of motion: diminished range of motion  Ankle joint range of motion:  diminished range of motion  5/5 muscle strength  tenderness to palpation left 4th toe with interphalangeal joint range of motion          1/25/2024  left MRI forefore w/o contrast:  Impression:   Edema in the subcutaneous fat along the dorsal aspect of the foot.   Metatarsals a ductus hallux valgus deformity of the 1st digit with arthritic changes of the 1st MTP joint.

## 2024-06-27 ENCOUNTER — OFFICE VISIT (OUTPATIENT)
Dept: URGENT CARE | Facility: CLINIC | Age: 74
End: 2024-06-27
Payer: MEDICARE

## 2024-06-27 VITALS
OXYGEN SATURATION: 99 % | RESPIRATION RATE: 19 BRPM | TEMPERATURE: 98 F | DIASTOLIC BLOOD PRESSURE: 84 MMHG | BODY MASS INDEX: 25.91 KG/M2 | HEIGHT: 70 IN | HEART RATE: 64 BPM | SYSTOLIC BLOOD PRESSURE: 138 MMHG | WEIGHT: 181 LBS

## 2024-07-03 PROBLEM — K55.1 SUPERIOR MESENTERIC ARTERY STENOSIS: Status: ACTIVE | Noted: 2024-07-03

## 2024-09-17 RX ORDER — PREGABALIN 75 MG/1
CAPSULE ORAL
Qty: 90 CAPSULE | Refills: 5 | Status: SHIPPED | OUTPATIENT
Start: 2024-09-17 | End: 2024-09-20

## 2024-09-30 PROBLEM — R94.39 ABNORMAL MYOCARDIAL PERFUSION STUDY: Status: ACTIVE | Noted: 2024-09-30

## 2024-09-30 PROBLEM — I20.9 ANGINA, CLASS III: Status: ACTIVE | Noted: 2024-09-30

## 2024-12-17 ENCOUNTER — TELEPHONE (OUTPATIENT)
Dept: PODIATRY | Facility: CLINIC | Age: 74
End: 2024-12-17

## 2024-12-17 NOTE — TELEPHONE ENCOUNTER
----- Message from Patricia sent at 12/17/2024  1:47 PM CST -----  Regarding: Sooner Appt  Contact: Patient  Type:  Sooner Apoointment Request    Caller is requesting a sooner appointment.  Caller declined first available appointment listed below.  Caller will not accept being placed on the waitlist and is requesting a message be sent to doctor.  Name of Caller:Patient   When is the first available appointment?01/06/2025   Symptoms:left foot ingrown toenail   Would the patient rather a call back or a response via [facility]? Call back   Best Call Back Number: [phone]  Additional Information: Symptom Screen was used and advised for patient to be seen within 3 days for non infected ingrown toenail Patient stated she has no redness or draining but it is painful due to going into skin Please Assist

## 2024-12-18 ENCOUNTER — TELEPHONE (OUTPATIENT)
Dept: PODIATRY | Facility: CLINIC | Age: 74
End: 2024-12-18

## 2024-12-18 NOTE — TELEPHONE ENCOUNTER
----- Message from Matthew sent at 12/18/2024  8:10 AM CST -----  Contact: pt  Type:  Same Day Appointment Request    Caller is requesting a same day appointment.  Caller declined first available appointment listed below.    Name of Caller:pt  When is the first available appointment? January   Symptoms:ingrown toe nail  Best Call Back Number:[phone]   Additional Information:

## 2024-12-18 NOTE — TELEPHONE ENCOUNTER
Staff contacted and scheduled patient for January 6th at 9:00 am., with Dr. Deng at the [place] location.      Patient verbalized understanding.

## 2025-01-06 ENCOUNTER — TELEPHONE (OUTPATIENT)
Dept: PODIATRY | Facility: CLINIC | Age: 75
End: 2025-01-06
Payer: MEDICARE

## 2025-01-06 NOTE — TELEPHONE ENCOUNTER
Staff tried to contact patient, no answer, left a message on voice mail informing patient that she has been rescheduled for January 9th at 1:45 pm., with Dr. Deng at the [place] location.

## 2025-01-09 ENCOUNTER — OFFICE VISIT (OUTPATIENT)
Dept: PODIATRY | Facility: CLINIC | Age: 75
End: 2025-01-09
Payer: MEDICARE

## 2025-01-09 VITALS
SYSTOLIC BLOOD PRESSURE: 166 MMHG | BODY MASS INDEX: 24.23 KG/M2 | HEIGHT: 71 IN | WEIGHT: 173.06 LBS | DIASTOLIC BLOOD PRESSURE: 67 MMHG | HEART RATE: 86 BPM

## 2025-01-09 DIAGNOSIS — M79.675 TOE PAIN, LEFT: ICD-10-CM

## 2025-01-09 DIAGNOSIS — L60.3 DYSTROPHIC NAIL: Chronic | ICD-10-CM

## 2025-01-09 DIAGNOSIS — L60.0 INGROWN NAIL: Primary | ICD-10-CM

## 2025-01-09 DIAGNOSIS — M20.5X2 OVERLAPPING TOE, ACQUIRED, LEFT: Chronic | ICD-10-CM

## 2025-01-09 PROCEDURE — 99214 OFFICE O/P EST MOD 30 MIN: CPT | Mod: S$GLB,,, | Performed by: PODIATRIST

## 2025-01-09 PROCEDURE — 1101F PT FALLS ASSESS-DOCD LE1/YR: CPT | Mod: CPTII,S$GLB,, | Performed by: PODIATRIST

## 2025-01-09 PROCEDURE — 1160F RVW MEDS BY RX/DR IN RCRD: CPT | Mod: CPTII,S$GLB,, | Performed by: PODIATRIST

## 2025-01-09 PROCEDURE — 3288F FALL RISK ASSESSMENT DOCD: CPT | Mod: CPTII,S$GLB,, | Performed by: PODIATRIST

## 2025-01-09 PROCEDURE — 1159F MED LIST DOCD IN RCRD: CPT | Mod: CPTII,S$GLB,, | Performed by: PODIATRIST

## 2025-01-09 PROCEDURE — 99999 PR PBB SHADOW E&M-EST. PATIENT-LVL IV: CPT | Mod: PBBFAC,,, | Performed by: PODIATRIST

## 2025-01-09 PROCEDURE — 3078F DIAST BP <80 MM HG: CPT | Mod: CPTII,S$GLB,, | Performed by: PODIATRIST

## 2025-01-09 PROCEDURE — 3077F SYST BP >= 140 MM HG: CPT | Mod: CPTII,S$GLB,, | Performed by: PODIATRIST

## 2025-01-09 PROCEDURE — 3008F BODY MASS INDEX DOCD: CPT | Mod: CPTII,S$GLB,, | Performed by: PODIATRIST

## 2025-01-09 PROCEDURE — 1126F AMNT PAIN NOTED NONE PRSNT: CPT | Mod: CPTII,S$GLB,, | Performed by: PODIATRIST

## 2025-01-09 RX ORDER — NEOMYCIN SULFATE, POLYMYXIN B SULFATE AND HYDROCORTISONE 10; 3.5; 1 MG/ML; MG/ML; [USP'U]/ML
2 SUSPENSION/ DROPS AURICULAR (OTIC) 2 TIMES DAILY
Qty: 10 ML | Refills: 0 | Status: SHIPPED | OUTPATIENT
Start: 2025-01-09

## 2025-01-09 NOTE — PROGRESS NOTES
PODIATRY    PATIENT ID:  Porsche Ring is a 74 y.o. female.     CHIEF CONCERN:   Diabetic Foot Exam (Foot Exam/PCP Javier Evans MD  11/26/24)         MEDICAL DECISION MAKING:      Problem List Items Addressed This Visit    None  Visit Diagnoses       Ingrown nail    -  Primary    Relevant Medications    neomycin-polymyxin-hydrocortisone (CORTISPORIN) 3.5-10,000-1 mg/mL-unit/mL-% otic suspension    Dystrophic nail  (Chronic)       Toe pain, left        Relevant Medications    neomycin-polymyxin-hydrocortisone (CORTISPORIN) 3.5-10,000-1 mg/mL-unit/mL-% otic suspension    Overlapping toe, acquired, left  (Chronic)                 I counseled the patient on the patient's conditions, their implications and medical management.    I trimmed the nail.  She reported relief without immediate complication.    Shoe and activity modification as needed for relief in the meantime.   Avoid offending shoes.  She was wearing her close toe diabetes shoes to [employer], after which pain started.     Prescription topical x one week.  Return to office as needed.          HISTORY OF PRESENT ILLNESS:  Porsche Ring is a 74 y.o. female with concerns regarding: left great toe pain, medial nail border.   A few weeks now, started after wearing a certain pair of shoes to [employer].   No drainage noted.    No acute trauma recalled otherwise.  She has been cleaning the toe with hydrogen peroxide and applying vicks to the area.       Patient Active Problem List   Diagnosis    Primary hypertension    HLD (hyperlipidemia)    PAD (peripheral artery disease)    Stage 3a chronic kidney disease    Glaucoma    S/P peripheral artery angioplasty with stent placement    Mild mitral regurgitation    Left ventricular hypertrophy    Diabetic neuropathy    Hammer toes of both feet    Knee injury    Stable angina    Hx of colonic polyps    Heart failure with preserved ejection fraction, NYHA class II    Coronary artery disease of native artery of  native heart with stable angina pectoris    Diabetes mellitus, type 2    Left atrial enlargement    Stenosis of left carotid artery    Claudication    ACE inhibitor-aggravated angioedema    Peripheral arterial disease    Dyspnea on exertion    Nonrheumatic tricuspid valve regurgitation    Mitral valve sclerosis    Sinus bradycardia    Nonspecific abnormal electrocardiogram (ECG) (EKG)    Stenosis of right carotid artery    Onychomycosis of multiple toenails with type 2 diabetes mellitus and peripheral neuropathy    Hyperlipidemia associated with type 2 diabetes mellitus    BMI 26.0-26.9,adult    Vitamin D deficiency    Superior mesenteric artery stenosis    Angina, class III    Abnormal myocardial perfusion study       Current Outpatient Medications on File Prior to Visit   Medication Sig Dispense Refill    albuterol (VENTOLIN HFA) 90 mcg/actuation inhaler 1 puff as needed Inhalation every 4 hrs      amLODIPine (NORVASC) 10 MG tablet Take 10 mg by mouth once daily.      apixaban (ELIQUIS) 5 mg Tab Take 1 tablet (5 mg total) by mouth 2 (two) times daily.      blood sugar diagnostic Strp 1 each by Misc.(Non-Drug; Combo Route) route 3 (three) times daily. 300 strip 3    carboxymethylcellulose sodium (REFRESH TEARS) 0.5 % Drop Apply 1-2 drops to eye every 6 to 8 hours as needed.      carvediloL (COREG) 12.5 MG tablet Take 1 tablet (12.5 mg total) by mouth 2 (two) times daily with meals. 180 tablet 3    docusate sodium (COLACE) 100 MG capsule Take 1 capsule (100 mg total) by mouth 2 (two) times daily. 60 capsule 11    DULoxetine (CYMBALTA) 20 MG capsule Take 20 mg by mouth once daily.      ergocalciferol (ERGOCALCIFEROL) 50,000 unit Cap TAKE 1 CAPSULE (50,000 UNITS TOTAL) BY MOUTH EVERY 7 DAYS. 12 capsule 1    furosemide (LASIX) 40 MG tablet Take 40 mg by mouth daily as needed (SOB, swelling).      HYDROcodone-acetaminophen (NORCO)  mg per tablet Take 1 tablet by mouth every 12 (twelve) hours as needed for Pain.    "   insulin degludec (TRESIBA FLEXTOUCH U-200) 200 unit/mL (3 mL) insulin pen Inject 52 Units into the skin once daily. 9 pen 3    lancets 32 gauge Misc 1 lancet  by Misc.(Non-Drug; Combo Route) route 3 (three) times daily. 200 each 3    latanoprost 0.005 % ophthalmic solution 1 drop every evening.      losartan (COZAAR) 25 MG tablet Take 25 mg by mouth every evening.      pen needle, diabetic 32 gauge x 5/32" Ndle 1 each by Misc.(Non-Drug; Combo Route) route 2 (two) times a day. 200 each 3    prasugreL (EFFIENT) 10 mg Tab Take 10 mg by mouth once daily.      pregabalin (LYRICA) 75 MG capsule Take 75 mg by mouth 2 (two) times daily.      REPATHA SURECLICK 140 mg/mL PnIj Inject 140 mg into the skin every 14 (fourteen) days. TAKES ON 15TH & END OF MONTH      TRADJENTA 5 mg Tab tablet TAKE 1 TABLET BY MOUTH ONCE DAILY 90 tablet 0    triamterene-hydrochlorothiazide 37.5-25 mg (DYAZIDE) 37.5-25 mg per capsule Take 1 capsule by mouth every morning.      aspirin (ECOTRIN) 81 MG EC tablet Take 1 tablet (81 mg total) by mouth once daily. 30 tablet 0    blood-glucose meter (TRUE METRIX AIR GLUCOSE METER) kit True metrix meter kit; Use as instructed 1 each 0     No current facility-administered medications on file prior to visit.       Review of patient's allergies indicates:   Allergen Reactions    Jardiance [empagliflozin] Other (See Comments)     UTI     Lisinopril Swelling    Metformin Other (See Comments)     CKD/HF         ROS:   General ROS: negative for - chills, fever or night sweats  Respiratory ROS: no cough, shortness of breath, or wheezing  Cardiovascular ROS: no chest pain or dyspnea on exertion      EXAM:     Vitals:    01/09/25 1337   BP: (!) 166/67   Pulse: 86   Weight: 78.5 kg (173 lb 1 oz)   Height: 5' 11" (1.803 m)        General:  Alert and Oriented x 3;  No acute distress    Vascular:   Dorsalis Pedis:  present   Posterior Tibial:  present  Capillary refill time:  3 seconds  Temperature of toes warm to " touch  Edema:  1+       Neurological:     Sharp touch:  normal  Light touch: normal  Tinels Sign:  Absent  Mulders Click:   Absent      Dermatological:   Skin: thin, atrophic, and shiny  Wounds/Ulcers:  Absent  Bruising:  absent.   Erythema:  Absent  Dystrophic thickened toenails x 10 bilateral.   Medial border LEFT hallux incurvated without paronychia.        Musculoskeletal:   Metatarsophalangeal range of motion:   diminished range of motion  Subtalar joint range of motion: diminished range of motion  Ankle joint range of motion:  diminished range of motion  5/5 muscle strength  Overlapping 2nd toes bilateral   Bilateral bunions.